# Patient Record
Sex: FEMALE | Race: WHITE | NOT HISPANIC OR LATINO
[De-identification: names, ages, dates, MRNs, and addresses within clinical notes are randomized per-mention and may not be internally consistent; named-entity substitution may affect disease eponyms.]

---

## 2020-02-27 ENCOUNTER — APPOINTMENT (OUTPATIENT)
Dept: NEUROSURGERY | Facility: CLINIC | Age: 85
End: 2020-02-27
Payer: MEDICARE

## 2020-02-27 VITALS
BODY MASS INDEX: 23.05 KG/M2 | HEIGHT: 64 IN | WEIGHT: 135 LBS | DIASTOLIC BLOOD PRESSURE: 72 MMHG | HEART RATE: 70 BPM | SYSTOLIC BLOOD PRESSURE: 133 MMHG

## 2020-02-27 DIAGNOSIS — N28.9 DISORDER OF KIDNEY AND URETER, UNSPECIFIED: ICD-10-CM

## 2020-02-27 DIAGNOSIS — I67.1 CEREBRAL ANEURYSM, NONRUPTURED: ICD-10-CM

## 2020-02-27 PROBLEM — Z00.00 ENCOUNTER FOR PREVENTIVE HEALTH EXAMINATION: Status: ACTIVE | Noted: 2020-02-27

## 2020-02-27 PROCEDURE — 99205 OFFICE O/P NEW HI 60 MIN: CPT

## 2020-03-02 NOTE — DATA REVIEWED
[de-identified] : 9 mm focus of signal abnormality in the left MCA cistern suspicious for arterial aneurysm, enhancing 1.9x 1.8 x1.5 cm left retrobulbar mass unchanged, no blood, no hydrocephalus

## 2020-03-02 NOTE — SURGICAL HISTORY
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

## 2020-03-02 NOTE — PHYSICAL EXAM
[Person] : oriented to person [Place] : oriented to place [Short Term Intact] : short term memory intact [Time] : oriented to time [Fluency] : fluency intact [Motor Tone] : muscle tone was normal in all four extremities [Motor Strength] : muscle strength was normal in all four extremities [Abnormal Walk] : normal gait [Sensation Tactile Decrease] : light touch was intact [No Muscle Atrophy] : normal bulk in all four extremities [2+] : Ankle jerk left 2+ [FreeTextEntry5] : blind in left eye, Right pupil reactive 2.0 mm in size, face symmetrical speech clear, tongue midline, decrease hearing  [FreeTextEntry6] : UE/LE motor 5/5

## 2020-03-02 NOTE — HISTORY OF PRESENT ILLNESS
[de-identified] : 86 YOF comes to office today for Endovascular evaluation. She had an MRI +/- of brain on 2/25/20120 showing a 9mm focus of signal abnormality in the left MCA cistern suspicious for arterial aneurysm. She was advised by  ENT Dr Homa Fowler's office to follow up today. She had the MRI of brain because of a 2 month history of sinus pressure and bilateral hearing loss. She denies any headaches, numbness, tingling or weakness in arms or legs or history of CVA. 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

## 2022-03-03 ENCOUNTER — APPOINTMENT (OUTPATIENT)
Dept: HEMATOLOGY ONCOLOGY | Facility: CLINIC | Age: 87
End: 2022-03-03
Payer: MEDICARE

## 2022-03-03 VITALS
SYSTOLIC BLOOD PRESSURE: 109 MMHG | HEIGHT: 64 IN | RESPIRATION RATE: 18 BRPM | BODY MASS INDEX: 22.02 KG/M2 | TEMPERATURE: 97.8 F | DIASTOLIC BLOOD PRESSURE: 73 MMHG | HEART RATE: 86 BPM | OXYGEN SATURATION: 98 % | WEIGHT: 129 LBS

## 2022-03-03 DIAGNOSIS — Z52.4 KIDNEY DONOR: ICD-10-CM

## 2022-03-03 DIAGNOSIS — Z87.39 PERSONAL HISTORY OF OTHER DISEASES OF THE MUSCULOSKELETAL SYSTEM AND CONNECTIVE TISSUE: ICD-10-CM

## 2022-03-03 DIAGNOSIS — K21.9 GASTRO-ESOPHAGEAL REFLUX DISEASE W/OUT ESOPHAGITIS: ICD-10-CM

## 2022-03-03 DIAGNOSIS — R21 RASH AND OTHER NONSPECIFIC SKIN ERUPTION: ICD-10-CM

## 2022-03-03 DIAGNOSIS — Z87.898 PERSONAL HISTORY OF OTHER SPECIFIED CONDITIONS: ICD-10-CM

## 2022-03-03 DIAGNOSIS — R68.83 CHILLS (WITHOUT FEVER): ICD-10-CM

## 2022-03-03 DIAGNOSIS — Z87.2 PERSONAL HISTORY OF DISEASES OF THE SKIN AND SUBCUTANEOUS TISSUE: ICD-10-CM

## 2022-03-03 DIAGNOSIS — Z86.79 PERSONAL HISTORY OF OTHER DISEASES OF THE CIRCULATORY SYSTEM: ICD-10-CM

## 2022-03-03 DIAGNOSIS — Z80.3 FAMILY HISTORY OF MALIGNANT NEOPLASM OF BREAST: ICD-10-CM

## 2022-03-03 DIAGNOSIS — Z87.09 PERSONAL HISTORY OF OTHER DISEASES OF THE RESPIRATORY SYSTEM: ICD-10-CM

## 2022-03-03 DIAGNOSIS — M19.019 PRIMARY OSTEOARTHRITIS, UNSPECIFIED SHOULDER: ICD-10-CM

## 2022-03-03 DIAGNOSIS — D64.9 ANEMIA, UNSPECIFIED: ICD-10-CM

## 2022-03-03 DIAGNOSIS — Z82.49 FAMILY HISTORY OF ISCHEMIC HEART DISEASE AND OTHER DISEASES OF THE CIRCULATORY SYSTEM: ICD-10-CM

## 2022-03-03 DIAGNOSIS — K92.1 MELENA: ICD-10-CM

## 2022-03-03 DIAGNOSIS — Z83.79 FAMILY HISTORY OF OTHER DISEASES OF THE DIGESTIVE SYSTEM: ICD-10-CM

## 2022-03-03 DIAGNOSIS — Z86.69 PERSONAL HISTORY OF OTHER DISEASES OF THE NERVOUS SYSTEM AND SENSE ORGANS: ICD-10-CM

## 2022-03-03 DIAGNOSIS — L85.3 XEROSIS CUTIS: ICD-10-CM

## 2022-03-03 DIAGNOSIS — Z80.0 FAMILY HISTORY OF MALIGNANT NEOPLASM OF DIGESTIVE ORGANS: ICD-10-CM

## 2022-03-03 DIAGNOSIS — Z87.891 PERSONAL HISTORY OF NICOTINE DEPENDENCE: ICD-10-CM

## 2022-03-03 DIAGNOSIS — Z78.9 OTHER SPECIFIED HEALTH STATUS: ICD-10-CM

## 2022-03-03 DIAGNOSIS — Z80.42 FAMILY HISTORY OF MALIGNANT NEOPLASM OF PROSTATE: ICD-10-CM

## 2022-03-03 PROCEDURE — 99205 OFFICE O/P NEW HI 60 MIN: CPT

## 2022-03-03 NOTE — REASON FOR VISIT
[Initial Consultation] : an initial consultation for [FreeTextEntry2] : Here for evaluation for anemia

## 2022-03-03 NOTE — ASSESSMENT
[FreeTextEntry1] : THis is a 89 y/o woman presenting with anemia , microcytic. Patient is symptomatic with sob and fatigue and dizziness. Stool for occult blood negative x3 \par Ferritin level was 13 with pcp \par \par 1) anemia \par likely due to iron def \par work up so far shows ferritin of 13, and very low iron sat with low mcv consistant with iron def \par B12 and folic acid are l normal levels\par Explained to patient that if her anemia does not improve with treatment of the iron deficiency we may need additional work-up as sometimes there are multiple factors contributing to the anemia.\par But for now send an extensive panel as she is already severely anemic and severely symptomatic.\par We did discuss treatment for iron deficiency as detailed below.\par Transfuse packed red blood cells for symptomatic anemia\par GI work-up\par \par 2) iron def anemia \par As above patient's CBC and iron studies are consistent with iron deficiency anemia\par We discussed treatment including IV iron such as Venofer or Injectafer\par Reviewed that given the severity of the patient's iron deficiency that oral iron will need to be given in high doses and this is difficult to tolerate given the risk of constipation\par Intravenous iron will allow for quicker repletion of iron stores and less side effects\par Did discuss the side effects of allergic reactions\par Also given the patient is symptomatic we discussed the pros and cons of doing a packed red blood cell transfusion.  Patient has severe shortness of breath and fatigue explained that her blood cell transfusion will improve the symptoms medially however do, the cost of the increased risk of allergic reactions infection and volume overload.\par Patient was also advised to see gastroenterology for an endoscopy and colonoscopy.\par Apparently patient has a history of gastric ulcers.\par Patient is resistant to the colonoscopy so will order CT of the chest abdomen pelvis to rule out malignancy.\par \par 3 ) weight loss \par Check CT scan of chest abdomen and pelvis rule out malignancy\par Endoscopy and colonoscopy\par \par Follow up in 1 week for cbc and iron infusion \par dw patient and daughter at length \par \par \par

## 2022-03-03 NOTE — HISTORY OF PRESENT ILLNESS
[de-identified] : This is an 88-year-old woman presenting with worsening anemia.\par Patient was started on oral iron once a day by her primary care doctor.\par \par She reports black stools since starting iron in feb 2022 when found anemia.  No blood or black stool before that. No change in bowel habits\par Now has skinnier stools now .  No \par No pain or reflux in esophagus , no dysphagia  No nausea or vomiting \par Loosing weight - 6months , 20pounds . states that she  eating the same \par Feeling more SOB then usual also , about 6 months ago , sent pulmonary . Lungs ok. \par Even walking on flat surfaces. 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 , had fall and left leg - knee and hip also . \par \par Hx of right hip total replacement \par Total shoulder was recommended \par \par Just switched to dr jones , in feb 2022 , patient was never told anemic \par \par Had colonoscopy and egd  over 5 yrs ago \par Hx of esophageal problem with concern for monteiro , was doing feqent egd, before 2020. 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

## 2022-03-03 NOTE — REVIEW OF SYSTEMS
[Fatigue] : fatigue [Recent Change In Weight] : ~T recent weight change [Palpitations] : palpitations [Shortness Of Breath] : shortness of breath [Joint Pain] : joint pain [Dizziness] : dizziness [Negative] : Heme/Lymph [Fever] : no fever [Night Sweats] : no night sweats [Chest Pain] : no chest pain [Cough] : no cough [FreeTextEntry3] : decrsaed vision left eye  [FreeTextEntry7] : thin stool  [de-identified] : no headache

## 2022-03-17 ENCOUNTER — APPOINTMENT (OUTPATIENT)
Dept: HEMATOLOGY ONCOLOGY | Facility: CLINIC | Age: 87
End: 2022-03-17
Payer: MEDICARE

## 2022-03-17 VITALS
RESPIRATION RATE: 18 BRPM | SYSTOLIC BLOOD PRESSURE: 130 MMHG | TEMPERATURE: 98 F | HEART RATE: 66 BPM | WEIGHT: 120 LBS | BODY MASS INDEX: 20.49 KG/M2 | OXYGEN SATURATION: 100 % | DIASTOLIC BLOOD PRESSURE: 76 MMHG | HEIGHT: 64 IN

## 2022-03-17 PROCEDURE — 99214 OFFICE O/P EST MOD 30 MIN: CPT

## 2022-03-18 LAB
FERRITIN SERPL-MCNC: 1118 NG/ML
IRON SATN MFR SERPL: 16 %
IRON SERPL-MCNC: 43 UG/DL
TIBC SERPL-MCNC: 272 UG/DL
UIBC SERPL-MCNC: 229 UG/DL

## 2022-03-18 NOTE — REASON FOR VISIT
[Initial Consultation] : an initial consultation for [Family Member] : family member [FreeTextEntry2] : here for follow up

## 2022-03-18 NOTE — HISTORY OF PRESENT ILLNESS
[de-identified] : This is an 88-year-old woman presenting with worsening anemia.\par Patient was started on oral iron once a day by her primary care doctor.\par \par She reports black stools since starting iron in feb 2022 when found anemia.  No blood or black stool before that. No change in bowel habits\par Now has skinnier stools now .  No \par No pain or reflux in esophagus , no dysphagia  No nausea or vomiting \par Loosing weight - 6months , 20pounds . states that she  eating the same \par Feeling more SOB then usual also , about 6 months ago , sent pulmonary . Lungs ok. \par Even walking on flat surfaces. 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 , had fall and left leg - knee and hip also . \par \par Hx of right hip total replacement \par Total shoulder was recommended \par \par Just switched to dr jones , in feb 2022 , patient was never told anemic \par \par Had colonoscopy and egd  over 5 yrs ago \par Hx of esophageal problem with concern for monteiro , was doing feqent egd, before 2020. \par \par \par  [de-identified] : 03/10/22- CT scan chest/abd/pelvis: questions ascending colonic mass vs. underdistention/contraction. advise correleatoin with colonscopy. there is also chronic thickened appearance of gastric antrum, nonspecific and perhaps r/t to contracted state and/or PUD. \par \par received injectafer 03/03 and 03/10 \par received 1 PRBC. \par prior symptoms of SOB much improved\par fatigue significantly improved\par had to reschedule GI appt with Dr. Pringle- physician was sick. rescheduled for next wednesday. \par

## 2022-03-18 NOTE — REVIEW OF SYSTEMS
[Recent Change In Weight] : ~T recent weight change [Joint Pain] : joint pain [Negative] : Heme/Lymph [Joint Stiffness] : joint stiffness [Fever] : no fever [Night Sweats] : no night sweats [Fatigue] : no fatigue [Chest Pain] : no chest pain [Palpitations] : no palpitations [Shortness Of Breath] : no shortness of breath [Cough] : no cough [Abdominal Pain] : no abdominal pain [Vomiting] : no vomiting [Constipation] : no constipation [Diarrhea] : no diarrhea [Dizziness] : no dizziness [FreeTextEntry3] : decreaed vision left eye  [FreeTextEntry7] : still has some thin stool .  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

## 2022-03-18 NOTE — ASSESSMENT
[FreeTextEntry1] : THis is a 89 y/o woman presenting with anemia , microcytic. Patient is symptomatic with sob and fatigue and dizziness. Stool for occult blood negative x3 \par Ferritin level was 13 with pcp \par \par 1) anemia \par likely due to iron def \par work up so far shows ferritin of 13, and very low iron sat with low mcv consistant with iron def \par B12 and folic acid are l normal levels\par Explained to patient that if her anemia does not improve with treatment of the iron deficiency we may need additional work-up as sometimes there are multiple factors contributing to the anemia.\par But for now send an extensive panel as she is already severely anemic and severely symptomatic.\par We did discuss treatment for iron deficiency as detailed below.\par s/p injectafer - repeat hgb 10.2 today. repeat ferritin today \par - GI appt ( Dr. Pringle) scheduled for next week. \par \par 2) iron def anemia \par As above patient's CBC and iron studies are consistent with iron deficiency anemia\par We discussed treatment including IV iron such as Venofer or Injectafer\par Reviewed that given the severity of the patient's iron deficiency that oral iron will need to be given in high doses and this is difficult to tolerate given the risk of constipation\par Intravenous iron will allow for quicker repletion of iron stores and less side effects\par Did discuss the side effects of allergic reactions\par Also given the patient is symptomatic we discussed the pros and cons of doing a packed red blood cell transfusion.  Patient has severe shortness of breath and fatigue explained that her blood cell transfusion will improve the symptoms medially however do, the cost of the increased risk of allergic reactions infection and volume overload.\par Patient is scheduled for gastroenterology for an endoscopy and colonoscopy.\par Apparently patient has a history of gastric ulcers.\par CT chest/abd/pelvis shows no active malignancy; However favors correlation with colonoscopy. \par \par 3 ) weight loss \par  CT scan of chest abdomen and pelvis as above. \par Endoscopy and colonoscopy \par \par dw patient and daughter in law at length \par Follow up in 2 weeks for repeat labs. \par Follow up in 4 weeks for MD/NP. \par

## 2022-03-23 ENCOUNTER — APPOINTMENT (OUTPATIENT)
Dept: GASTROENTEROLOGY | Facility: CLINIC | Age: 87
End: 2022-03-23
Payer: MEDICARE

## 2022-03-23 VITALS
OXYGEN SATURATION: 99 % | WEIGHT: 120 LBS | HEART RATE: 68 BPM | HEIGHT: 64 IN | DIASTOLIC BLOOD PRESSURE: 76 MMHG | BODY MASS INDEX: 20.49 KG/M2 | SYSTOLIC BLOOD PRESSURE: 128 MMHG | RESPIRATION RATE: 16 BRPM | TEMPERATURE: 98.3 F

## 2022-03-23 DIAGNOSIS — Z86.010 PERSONAL HISTORY OF COLONIC POLYPS: ICD-10-CM

## 2022-03-23 PROCEDURE — 99204 OFFICE O/P NEW MOD 45 MIN: CPT

## 2022-03-23 NOTE — HISTORY OF PRESENT ILLNESS
[FreeTextEntry1] : 89 yo F hx hiatal hernia, hx Barretts esophagus (with mild dysplasia) hx gastric ulcer,  referred for AWAIS. \par Prior EGD/colonoscopy reports reviewed - hx of Barretts esophagus. Not on PPI. \par hx colon polyps. \par 2/2016- 3 tubular adenoma\par 2/16 Barretts path:  focal intestinal metaplasia with Mild dysplasia\par \par EGD 9/2017- +ulcer in antrum, + barretts. Path negative for IM/dysplasia, ulcer neg Hpylori or IM/dysplasia. \par \par Pt followed by heme for iron def anemia, ferritin 13. required 1U PRBC and IV iron. \par Pt also c/o weight loss , 135 down to 121 over a month. \par \par CT c/a/p- question ascending colon mass vs underdistension, thickening of gastric antrum. \par \par No aspirin, blood thinners. \par \par Takes tylenol for frozen shoulder. \par No NSAIDs. \par \par Last colonoscopy: 2/2016- 3 TAs. \par \par Soc: no tobacco or significant EtOH\par \par Family Hx: no significant GI family history including colon cancer, stomach cancer, IBD, celiac\par \par ROS:\par constitutional: no weight loss, fevers\par ENT: no deafness\par Eyes: no blindness\par Neck: no lymphadenopathy\par Chest: no shortness of breath, no cough\par Cardiac: no chest pain, no palpitations\par Vascular: no leg swelling\par GI: no abdominal pain, nausea, vomiting, diarrhea, constipation, rectal bleeding, melena, dysphagia,  unless otherwise noted in HPI\par : no dysuria, dark urine\par Skin: no rashes, lesions, jaundice\par Heme: no bleeding\par Endocrine: no DM  unless otherwise stated in HPI\par \par Physical Exam: (VS noted below)\par General: alert, comfortable, in no acute distress\par Eyes: normal sclera, anicteric\par Neck: normal, supple, no neck mass\par Pulm: no respiratory distress, clear to auscultation bilaterally \par Heart: RRR, normal S1 S2\par Abd: Soft, non-tender, non-distended, normal bowel sounds, no appreciable hepatosplenomegaly, no masses palpated\par Rectal: deferred\par Ext: warm and well perfused, no edema\par Skin: no rashes, no juandice\par Neuro: alert, grossly nonfocal\par \par Labs/imaging/prior endoscopic results were reviewed to the extent available and pertinent findings noted in HPI\par \par

## 2022-03-23 NOTE — CONSULT LETTER
[Dear  ___] : Dear  [unfilled], [Consult Letter:] : I had the pleasure of evaluating your patient, [unfilled]. [Please see my note below.] : Please see my note below. [Consult Closing:] : Thank you very much for allowing me to participate in the care of this patient.  If you have any questions, please do not hesitate to contact me. [Sincerely,] : Sincerely, [FreeTextEntry3] : Mouna Pringle M.D.\par

## 2022-03-23 NOTE — ASSESSMENT
[FreeTextEntry1] : 87 yo F hx Barett's, gastric ulcer, colon polyps ,now  with iron def anemia, recommend EGD/Colonoscopy for further evaluation. \par \par Will plan on an upper endoscopy for iron def anemia, hx Barretts and GERD. Explained nature of procedure and the risks/benefits/alternative including but not limited to bleeding, infection, perforation, missed lesions, anesthesia complications. Patient understands and agrees, all questions answered.\par \par Will plan on a colonoscopy for iron def anemia, hx polyps. Explained nature of procedure and risks/benefits/alternatives including but not limited to bleeding, infection, perforation, missed lesions, anesthesia complications. Patient understands and agrees, all questions answered. Will use a split dose miralax  cleanse with clears the day prior.\par

## 2022-04-01 ENCOUNTER — RESULT REVIEW (OUTPATIENT)
Age: 87
End: 2022-04-01

## 2022-04-03 ENCOUNTER — RESULT REVIEW (OUTPATIENT)
Age: 87
End: 2022-04-03

## 2022-04-04 ENCOUNTER — APPOINTMENT (OUTPATIENT)
Dept: GASTROENTEROLOGY | Facility: HOSPITAL | Age: 87
End: 2022-04-04
Payer: MEDICARE

## 2022-04-04 PROCEDURE — 45378 DIAGNOSTIC COLONOSCOPY: CPT

## 2022-04-04 PROCEDURE — 43239 EGD BIOPSY SINGLE/MULTIPLE: CPT

## 2022-04-06 ENCOUNTER — APPOINTMENT (OUTPATIENT)
Dept: HEMATOLOGY ONCOLOGY | Facility: CLINIC | Age: 87
End: 2022-04-06

## 2022-04-06 VITALS
HEIGHT: 64 IN | WEIGHT: 119 LBS | BODY MASS INDEX: 20.32 KG/M2 | OXYGEN SATURATION: 100 % | TEMPERATURE: 98 F | SYSTOLIC BLOOD PRESSURE: 129 MMHG | HEART RATE: 71 BPM | DIASTOLIC BLOOD PRESSURE: 80 MMHG | RESPIRATION RATE: 18 BRPM

## 2022-04-21 ENCOUNTER — APPOINTMENT (OUTPATIENT)
Dept: HEMATOLOGY ONCOLOGY | Facility: CLINIC | Age: 87
End: 2022-04-21

## 2022-04-21 ENCOUNTER — APPOINTMENT (OUTPATIENT)
Dept: GASTROENTEROLOGY | Facility: CLINIC | Age: 87
End: 2022-04-21

## 2022-04-21 VITALS
WEIGHT: 119 LBS | HEART RATE: 69 BPM | RESPIRATION RATE: 18 BRPM | BODY MASS INDEX: 20.32 KG/M2 | HEIGHT: 64 IN | TEMPERATURE: 97.9 F | DIASTOLIC BLOOD PRESSURE: 60 MMHG | SYSTOLIC BLOOD PRESSURE: 112 MMHG | OXYGEN SATURATION: 97 %

## 2022-04-22 LAB — FERRITIN SERPL-MCNC: 469 NG/ML

## 2022-05-04 ENCOUNTER — APPOINTMENT (OUTPATIENT)
Dept: GASTROENTEROLOGY | Facility: CLINIC | Age: 87
End: 2022-05-04
Payer: MEDICARE

## 2022-05-04 PROCEDURE — 99443: CPT | Mod: 95

## 2022-05-04 NOTE — REASON FOR VISIT
[Home] : at home, [unfilled] , at the time of the visit. [Medical Office: ([facility])___] : at the medical office located in  [Verbal consent obtained from patient] : the patient, [unfilled] [Follow-Up: _____] : a [unfilled] follow-up visit

## 2022-05-04 NOTE — HISTORY OF PRESENT ILLNESS
[FreeTextEntry1] : Follow up for iron def anemia, gastric ulcer, monteiro's esophagus. \par EGD reviewed - large ulcer with surrounding nodule/mass, gastritis, monteiro's esophagus. \par Path: Monteiro's with indefinite for dysplasia  (rec repeat after treatment for GERD), Neg HP, no malignancy. \par \par Pt taking PPI BID\par doing well\par denies GI bleeding\par has upcoming appt w heme to check on H/H and iron panel. \par \par \par ROS:\par constitutional: no weight loss, fevers\par ENT: no deafness\par Eyes: no blindness\par Neck: no lymphadenopathy\par Chest: no shortness of breath, no cough\par Cardiac: no chest pain, no palpitations\par Vascular: no leg swelling\par GI: no abdominal pain, nausea, vomiting, diarrhea, constipation, rectal bleeding, melena, dysphagia,  unless otherwise noted in HPI\par : no dysuria, dark urine\par Skin: no rashes, lesions, jaundice\par Heme: no bleeding\par Endocrine: no DM  unless otherwise stated in HPI\par \par Labs/imaging/prior endoscopic results were reviewed to the extent available and pertinent findings noted in HPI\par

## 2022-05-04 NOTE — ASSESSMENT
[FreeTextEntry1] : 87 yo F with iron def anemia due to gastric ulcer.  Path neg for malignancy. Was taking + NAIDS (Aleve)\par + Barretts with indef for dysplasia. \par \par - avoid nsaids\par - PPI BID x 3 mo\par - repeat EGD in  3mo for ulcer healing and re-biopsy Barretts indef for dysplasia\par \par Will plan on an upper endoscopy for gastric ulcer -- due in 3 mo. Explained nature of procedure and the risks/benefits/alternative including but not limited to bleeding, infection, perforation, missed lesions, anesthesia complications. Patient understands and agrees, all questions answered.\par

## 2022-05-12 ENCOUNTER — APPOINTMENT (OUTPATIENT)
Dept: HEMATOLOGY ONCOLOGY | Facility: CLINIC | Age: 87
End: 2022-05-12
Payer: MEDICARE

## 2022-05-12 VITALS
DIASTOLIC BLOOD PRESSURE: 65 MMHG | WEIGHT: 116 LBS | TEMPERATURE: 97.9 F | HEIGHT: 64 IN | RESPIRATION RATE: 18 BRPM | BODY MASS INDEX: 19.81 KG/M2 | HEART RATE: 69 BPM | OXYGEN SATURATION: 98 % | SYSTOLIC BLOOD PRESSURE: 125 MMHG

## 2022-05-12 DIAGNOSIS — R63.4 ABNORMAL WEIGHT LOSS: ICD-10-CM

## 2022-05-12 PROCEDURE — 99214 OFFICE O/P EST MOD 30 MIN: CPT

## 2022-05-12 NOTE — REASON FOR VISIT
[Follow-Up Visit] : a follow-up visit for [FreeTextEntry2] : Patient presents for follow-up of iron deficiency

## 2022-05-12 NOTE — HISTORY OF PRESENT ILLNESS
[de-identified] : This is an 88-year-old woman presenting with worsening anemia.\par Patient was started on oral iron once a day by her primary care doctor.\par \par She reports black stools since starting iron in feb 2022 when found anemia.  No blood or black stool before that. No change in bowel habits\par Now has skinnier stools now .  No \par No pain or reflux in esophagus , no dysphagia  No nausea or vomiting \par Loosing weight - 6months , 20pounds . states that she  eating the same \par Feeling more SOB then usual also , about 6 months ago , sent pulmonary . Lungs ok. \par Even walking on flat surfaces. 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 , had fall and left leg - knee and hip also . \par \par Hx of right hip total replacement \par Total shoulder was recommended \par \par Just switched to dr jones , in feb 2022 , patient was never told anemic \par \par Had colonoscopy and egd  over 5 yrs ago \par Hx of esophageal problem with concern for monteiro , was doing feqent egd, before 2020. \par \par 3/2022- ct scan shows thickening in colon and stomach \par  [de-identified] : \par sob and fatigue is better \par climbing stairs \par ulcer egd  on schedule \par on ppi ( omepeprazole ) \par had colonoscopy \par dr jones pcp \par taking oral iron  once day \par going for hip replacement \par \par hb is 11.7 now

## 2022-05-12 NOTE — ASSESSMENT
[FreeTextEntry1] : THis is a 87 y/o woman presenting with anemia , microcytic. Patient is symptomatic with sob and fatigue and dizziness. Stool for occult blood negative x3 \par Ferritin level was 13 with pcp \par \par 1) anemia \par likely due to iron def \par Treated with blood transfusion and intravenous iron with significant improvement\par If anemia fails to resolve will need additional work-up including bone marrow biopsy\par repeat cbc today   shows hb of 11.7 near normal \par \par 2) iron def anemia \par -Ferritin was severely low on presentation with anemia\par -Treated with blood transfusion as patient was severely symptomatic with fatigue and shortness of breath\par -Treated with IV iron, Injectafer\par -Tolerated well with significant improvement in symptoms and hemoglobin\par -CT scan showed thickening in the stomach and colon therefore patient was referred for endoscopy and colonoscopy\par -Patient saw GI and had procedures done and was found to have very large ulcer in her stomach and monteiro's\par Currently on treatment for the ulcer.\par - repeat egd in 3 months \par -repeat ferritin today \par \par 3 ) weight loss \par continues to loose weight ? etiology , due to ulcer ? patient states she feels much better now \par nutrition consult \par s/p CT scan of chest abdomen and pelvis  showed thickeing of stomach and colon \par s/p Endoscopy ( and colonoscopy? ) \par \par \par Follow up in 1 month for labs and to check weight , 3 month MD appt \par \par

## 2022-05-12 NOTE — REVIEW OF SYSTEMS
[Negative] : Allergic/Immunologic [FreeTextEntry2] : improved energy  [FreeTextEntry7] : no bowel changes

## 2022-05-15 LAB
ALBUMIN SERPL ELPH-MCNC: 4.1 G/DL
ALP BLD-CCNC: 86 U/L
ALT SERPL-CCNC: 8 U/L
ANION GAP SERPL CALC-SCNC: 17 MMOL/L
AST SERPL-CCNC: 12 U/L
BILIRUB SERPL-MCNC: 0.3 MG/DL
BUN SERPL-MCNC: 16 MG/DL
CALCIUM SERPL-MCNC: 9.7 MG/DL
CHLORIDE SERPL-SCNC: 99 MMOL/L
CO2 SERPL-SCNC: 25 MMOL/L
CREAT SERPL-MCNC: 0.77 MG/DL
EGFR: 74 ML/MIN/1.73M2
FERRITIN SERPL-MCNC: 511 NG/ML
FOLATE SERPL-MCNC: 7.5 NG/ML
GLUCOSE SERPL-MCNC: 60 MG/DL
POTASSIUM SERPL-SCNC: 4.3 MMOL/L
PROT SERPL-MCNC: 6.3 G/DL
SODIUM SERPL-SCNC: 141 MMOL/L
VIT B12 SERPL-MCNC: 500 PG/ML

## 2022-06-15 ENCOUNTER — APPOINTMENT (OUTPATIENT)
Dept: HEMATOLOGY ONCOLOGY | Facility: CLINIC | Age: 87
End: 2022-06-15

## 2022-06-15 VITALS
HEIGHT: 64 IN | HEART RATE: 75 BPM | BODY MASS INDEX: 20.14 KG/M2 | RESPIRATION RATE: 18 BRPM | TEMPERATURE: 98.2 F | WEIGHT: 118 LBS | DIASTOLIC BLOOD PRESSURE: 64 MMHG | SYSTOLIC BLOOD PRESSURE: 121 MMHG | OXYGEN SATURATION: 96 %

## 2022-06-20 LAB
FERRITIN SERPL-MCNC: 498 NG/ML
IRON SATN MFR SERPL: 17 %
IRON SERPL-MCNC: 33 UG/DL
TIBC SERPL-MCNC: 196 UG/DL
UIBC SERPL-MCNC: 163 UG/DL

## 2022-07-12 ENCOUNTER — APPOINTMENT (OUTPATIENT)
Dept: HEMATOLOGY ONCOLOGY | Facility: CLINIC | Age: 87
End: 2022-07-12

## 2022-07-12 ENCOUNTER — RESULT REVIEW (OUTPATIENT)
Age: 87
End: 2022-07-12

## 2022-07-12 VITALS
BODY MASS INDEX: 20.32 KG/M2 | RESPIRATION RATE: 18 BRPM | HEART RATE: 69 BPM | HEIGHT: 64 IN | TEMPERATURE: 98.3 F | OXYGEN SATURATION: 98 % | SYSTOLIC BLOOD PRESSURE: 128 MMHG | WEIGHT: 119 LBS | DIASTOLIC BLOOD PRESSURE: 66 MMHG

## 2022-07-12 PROCEDURE — 99214 OFFICE O/P EST MOD 30 MIN: CPT

## 2022-07-14 NOTE — REVIEW OF SYSTEMS
[Negative] : Allergic/Immunologic [FreeTextEntry2] : improved energy  [FreeTextEntry7] : no bowel changes  [FreeTextEntry9] : hips are feeling better after surgery. able to walk again without pain

## 2022-07-14 NOTE — ASSESSMENT
[FreeTextEntry1] : THis is a 89 y/o woman presenting with anemia , microcytic. Patient is symptomatic with sob and fatigue and dizziness. Stool for occult blood negative x3 \par Ferritin level was 13 with pcp . now improved with injectafer. \par \par 1) anemia \par likely due to iron def \par Treated with blood transfusion and intravenous iron with significant improvement\par If anemia fails to resolve will need additional work-up including bone marrow biopsy\par repeat cbc today   shows hb of 11.5 - stable. \par \par 2) iron def anemia \par -Ferritin was severely low on presentation with anemia\par -Treated with blood transfusion as patient was severely symptomatic with fatigue and shortness of breath\par -Treated with IV iron, Injectafer\par -Tolerated well with significant improvement in symptoms and hemoglobin\par -CT scan showed thickening in the stomach and colon therefore patient was referred for endoscopy and colonoscopy\par -Patient saw GI and had procedures done and was found to have very large ulcer in her stomach and monteiro's\par Currently on treatment for the ulcer.\par - repeat egd in 3 months per Dr. Pringle\par -ferritin elevated June 2022. continue to monitor closely \par \par 3 ) weight loss \par weight stable. \par pt states she feels better. just has a hard time choosing what to eat. \par consider nutrition consult \par s/p CT scan of chest abdomen and pelvis  showed thickening of stomach and colon \par s/p Endoscopy /colonscopy ?\par \par \par Follow up in 1 month for repeat labs. \par RTC 6 months , sooner PRN. \par \par

## 2022-09-06 ENCOUNTER — APPOINTMENT (OUTPATIENT)
Dept: HEMATOLOGY ONCOLOGY | Facility: CLINIC | Age: 87
End: 2022-09-06

## 2022-09-06 ENCOUNTER — RESULT REVIEW (OUTPATIENT)
Age: 87
End: 2022-09-06

## 2022-09-06 VITALS
HEIGHT: 64 IN | DIASTOLIC BLOOD PRESSURE: 65 MMHG | RESPIRATION RATE: 18 BRPM | TEMPERATURE: 98.1 F | BODY MASS INDEX: 20.49 KG/M2 | WEIGHT: 120 LBS | SYSTOLIC BLOOD PRESSURE: 121 MMHG | HEART RATE: 76 BPM | OXYGEN SATURATION: 98 %

## 2022-09-08 LAB
25(OH)D3 SERPL-MCNC: 74 NG/ML
FERRITIN SERPL-MCNC: 686 NG/ML
FOLATE SERPL-MCNC: >20 NG/ML
GGT SERPL-CCNC: 725 U/L
IRON SATN MFR SERPL: 14 %
IRON SERPL-MCNC: 34 UG/DL
TIBC SERPL-MCNC: 244 UG/DL
UIBC SERPL-MCNC: 210 UG/DL
VIT B12 SERPL-MCNC: 614 PG/ML

## 2022-09-11 ENCOUNTER — RESULT REVIEW (OUTPATIENT)
Age: 87
End: 2022-09-11

## 2022-09-12 ENCOUNTER — APPOINTMENT (OUTPATIENT)
Dept: PAIN MANAGEMENT | Facility: CLINIC | Age: 87
End: 2022-09-12

## 2022-09-12 VITALS — SYSTOLIC BLOOD PRESSURE: 100 MMHG | OXYGEN SATURATION: 98 % | HEART RATE: 78 BPM | DIASTOLIC BLOOD PRESSURE: 60 MMHG

## 2022-09-12 VITALS — HEIGHT: 64 IN | WEIGHT: 120 LBS | BODY MASS INDEX: 20.49 KG/M2

## 2022-09-12 PROCEDURE — 99204 OFFICE O/P NEW MOD 45 MIN: CPT

## 2022-09-12 NOTE — CONSULT LETTER
[Dear  ___] : Dear  [unfilled], [Courtesy Letter:] : I had the pleasure of seeing your patient, [unfilled], in my office today. [Please see my note below.] : Please see my note below. [Consult Closing:] : Thank you very much for allowing me to participate in the care of this patient.  If you have any questions, please do not hesitate to contact me. [Sincerely,] : Sincerely, [FreeTextEntry3] : Rivera Ernst DO

## 2022-09-12 NOTE — REVIEW OF SYSTEMS
[Joint Pain] : joint pain [Decreased ROM] : decreased range of motion [Weakness] : weakness [Negative] : Heme/Lymph [FreeTextEntry9] : Swelling

## 2022-09-12 NOTE — DATA REVIEWED
[FreeTextEntry1] : LEFT Shoulder XRAY 9/2021\par \par Severe  osteoarthritis\par \par Exam: CT CERVICAL SPINE \par Order#: [id number] \par \par \par \par History: fall. \par \par  CT cervical spine 4/24/2021 \par \par  Thin helical axial sections through the cervical spine obtained with coronal and sagittal \par reconstructed images provided. \par \par  There is straightening of the cervical curvature on sagittal reconstructed images. There is mild \par dextroconvexity of the cervical spine. There is no acute fracture. There is mild spondylolisthesis \par of C4 on C5, C5 on C6 and C7 on T1. The craniocervical junction and C1-C2 relationship maintained. 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 \par \par  There is multilevel cervical spondylosis. There is marked degenerative disc disease at C6-7 \par resulting in mild right-sided posterior endplate osteophytic ridging/calcified disc resulting in \par mild right-sided spinal stenosis. There is bilateral facet arthritis and uncovertebral disease \par resulting in variable degrees of bony foraminal stenoses. \par \par  There is no paraspinal hematoma. There is a enlarged heterogeneous and paucity dense left thyroid \par lobe mass measuring approximately 2.6 x 3.5 x 4 cm. \par \par  Limited images through the upper thorax reveals no apical consolidation or pneumothorax. \par \par \par  IMPRESSION: \par \par  No acute fracture cervical spine. See further comments above. 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 Clinical correlation. \par \par --\par \par \par [facility] Reference #: [id number]\par \par Others' Prescriptions\par Patient Name: Grace KnutsonBirth Date: 01/07/1934\par Address: [address] [place] [address] [place] [address]Hxs: Female\par Rx Written	Rx Dispensed	Drug	Quantity	Days Supply	Prescriber Name	Prescriber Maria G #	Payment Method	Dispenser\par 06/07/2022	06/07/2022	tramadol hcl 50 mg tablet	30	7	Vanesa Qiu	[id number]	Medicare	[id number]\par

## 2022-09-12 NOTE — HISTORY OF PRESENT ILLNESS
[10 (pain as bad as you can imagine)] : 1. What number best describes your pain on average in the past week? 10/10 pain [10 (completely interferes)] : 3. What number best describes how, during the past week, pain has interfered with your general activity? 10/10 pain [_______] : [unfilled] [___ yrs] : [unfilled] year(s) ago [Constant] : constant [6] : a minimum pain level of 6/10 [10] : a maximum pain level of 10/10 [Sharp] : sharp [Transitioning] : transitioning [Lifting] : lifting [Medications] : medications [Heat] : heat [FreeTextEntry1] : HPI - MsRose LOU is a 88 year F Rt hand dominant with PHx as below, referred by Doreen Dickson who presents today with chief complaint of shoulder pain LEFT sided. Reports that it developed 2 years ago. It is located in the cervical spine;  there is radiation of the pain into the left shoulder. The pain is presently 10/10 in severity on the numerical rating scale. It is sharp in nature. The pain is constant. There is diurnal worsening, during the night. The pain is aggravated with cervical extension, LUE overhead activity. The pain improves with rest. The pain is functionally and emotionally disabling for the patient as its preventing them from going about activities of daily living, such as routine housework. The pain does impair the patient’s ability to sleep. \par \par Patient has not been seen by pain management in the past.\par Patient attests to 6 weeks of provider directed treatment, including physical therapy\par Patient reports treatment that occurred within the last 3 months\par Patient reports that symptoms have been persistent and and progressing after treatment\par  \par Reports there is no present numbness, there is no weakness. Patient denies any bowel/bladder incontinence, no saddle/perineal anesthesia or any other red flag signs or symptoms. Reports regular BMs.\par   [FreeTextEntry2] : 30 [FreeTextEntry7] : Patient presents with both shoulders pain. The left shoulder is more severe than the right with the pain level of 6.

## 2022-09-12 NOTE — ASSESSMENT
[FreeTextEntry1] : Ms. ALICIA LOU is a 88 year F solitary  suffering from left sided neck and shoulder pain, that based upon today's subjective complaints, physical examination, and imaging review, is likely secondary to osteoarthritis\par \par >> Medications\par \par Chronic opioid use for non-malignant pain, in particular at high doses would not be recommended since it can potentially lead to hyperalgesia (hypersensitivity), tolerance and addiction. \par  \par Trial Lidocaine patch\par  \par >> Interventions\par \par LEFT sided peripheral nerve stilulator with SPRINT of Suprascapular nerve for shoulder pain\par  \par >> Therapy and Other Modalities\par  \par Continue with daily home stretching regimen\par \par >> Imaging and Other Studies\par \par See Media \par \par >> Consults\par \par None ordered

## 2022-09-12 NOTE — PHYSICAL EXAM
[de-identified] : General: Well-developed and well-nourished.  No acute distress.\par Psychiatric: Behavior was cooperative \par Head:  Normocephalic and atraumatic\par Eyes:  Sclera white. Conjunctiva and eyelids pink and moist without discharge.\par Cardiovascular:  Regular\par Respiratory:  Trachea midline. Normal effort.\par No accessory muscle use with respiration\par Abdomen: Non distended, soft and nontender\par Skin:  No rashes, ulcers, or lesions appreciated.\par Neck: There is pain with extension,   limited ROM\par Shoulder Exam\par ROM: ++ limited by pain\par Empty can sign: positive\par Torres exam: positive\par Scarf test: positive\par AC Joint Tenderness: positive \par Extremities: No edema \par Neuro: CN 2-12 Grossly intact\par Sensation to light touch is intact in all extremities\par Gait: Ambulates with SAC assistive device.

## 2022-09-16 ENCOUNTER — NON-APPOINTMENT (OUTPATIENT)
Age: 87
End: 2022-09-16

## 2022-09-20 ENCOUNTER — NON-APPOINTMENT (OUTPATIENT)
Age: 87
End: 2022-09-20

## 2022-10-03 ENCOUNTER — TRANSCRIPTION ENCOUNTER (OUTPATIENT)
Age: 87
End: 2022-10-03

## 2022-10-18 ENCOUNTER — APPOINTMENT (OUTPATIENT)
Dept: HEMATOLOGY ONCOLOGY | Facility: CLINIC | Age: 87
End: 2022-10-18

## 2022-10-18 ENCOUNTER — RESULT REVIEW (OUTPATIENT)
Age: 87
End: 2022-10-18

## 2022-10-18 VITALS
BODY MASS INDEX: 20.14 KG/M2 | HEIGHT: 64 IN | RESPIRATION RATE: 18 BRPM | OXYGEN SATURATION: 99 % | TEMPERATURE: 97.5 F | DIASTOLIC BLOOD PRESSURE: 67 MMHG | HEART RATE: 61 BPM | WEIGHT: 118 LBS | SYSTOLIC BLOOD PRESSURE: 131 MMHG

## 2022-10-20 LAB
FERRITIN SERPL-MCNC: 431 NG/ML
IRON SATN MFR SERPL: 20 %
IRON SERPL-MCNC: 49 UG/DL
TIBC SERPL-MCNC: 240 UG/DL
UIBC SERPL-MCNC: 191 UG/DL

## 2022-11-14 ENCOUNTER — RX RENEWAL (OUTPATIENT)
Age: 87
End: 2022-11-14

## 2023-01-13 ENCOUNTER — LABORATORY RESULT (OUTPATIENT)
Age: 88
End: 2023-01-13

## 2023-01-13 ENCOUNTER — RESULT REVIEW (OUTPATIENT)
Age: 88
End: 2023-01-13

## 2023-01-13 ENCOUNTER — APPOINTMENT (OUTPATIENT)
Dept: HEMATOLOGY ONCOLOGY | Facility: CLINIC | Age: 88
End: 2023-01-13

## 2023-01-13 VITALS
WEIGHT: 120 LBS | SYSTOLIC BLOOD PRESSURE: 113 MMHG | BODY MASS INDEX: 20.49 KG/M2 | OXYGEN SATURATION: 97 % | HEIGHT: 64 IN | RESPIRATION RATE: 18 BRPM | DIASTOLIC BLOOD PRESSURE: 64 MMHG | TEMPERATURE: 97.6 F | HEART RATE: 60 BPM

## 2023-01-16 LAB
25(OH)D3 SERPL-MCNC: 70.8 NG/ML
FERRITIN SERPL-MCNC: 597 NG/ML
FOLATE SERPL-MCNC: >20 NG/ML
IRON SATN MFR SERPL: 13 %
IRON SERPL-MCNC: 29 UG/DL
TIBC SERPL-MCNC: 226 UG/DL
UIBC SERPL-MCNC: 196 UG/DL
VIT B12 SERPL-MCNC: 605 PG/ML

## 2023-01-17 ENCOUNTER — APPOINTMENT (OUTPATIENT)
Dept: HEMATOLOGY ONCOLOGY | Facility: CLINIC | Age: 88
End: 2023-01-17
Payer: MEDICARE

## 2023-01-31 ENCOUNTER — APPOINTMENT (OUTPATIENT)
Dept: HEMATOLOGY ONCOLOGY | Facility: CLINIC | Age: 88
End: 2023-01-31
Payer: MEDICARE

## 2023-01-31 VITALS
DIASTOLIC BLOOD PRESSURE: 68 MMHG | SYSTOLIC BLOOD PRESSURE: 135 MMHG | TEMPERATURE: 98 F | HEIGHT: 64 IN | RESPIRATION RATE: 18 BRPM | WEIGHT: 120 LBS | OXYGEN SATURATION: 98 % | BODY MASS INDEX: 20.49 KG/M2 | HEART RATE: 68 BPM

## 2023-01-31 PROCEDURE — 36415 COLL VENOUS BLD VENIPUNCTURE: CPT

## 2023-01-31 PROCEDURE — 99213 OFFICE O/P EST LOW 20 MIN: CPT | Mod: 25

## 2023-01-31 RX ORDER — DEXLANSOPRAZOLE 60 MG/1
60 CAPSULE, DELAYED RELEASE ORAL
Qty: 30 | Refills: 4 | Status: DISCONTINUED | COMMUNITY
Start: 2022-04-04 | End: 2023-01-31

## 2023-01-31 RX ORDER — FERROUS SULFATE 325(65) MG
325 (65 FE) TABLET ORAL
Refills: 0 | Status: ACTIVE | COMMUNITY

## 2023-01-31 RX ORDER — MULTIVITAMIN/IRON/FOLIC ACID 18MG-0.4MG
TABLET ORAL
Refills: 0 | Status: ACTIVE | COMMUNITY

## 2023-01-31 RX ORDER — DIAZEPAM 5 MG/1
5 TABLET ORAL
Qty: 2 | Refills: 0 | Status: DISCONTINUED | COMMUNITY
Start: 2022-09-14 | End: 2023-01-31

## 2023-01-31 RX ORDER — OMEPRAZOLE 40 MG/1
40 CAPSULE, DELAYED RELEASE ORAL
Qty: 180 | Refills: 0 | Status: DISCONTINUED | COMMUNITY
Start: 2022-04-05 | End: 2023-01-31

## 2023-01-31 RX ORDER — ATORVASTATIN CALCIUM 40 MG/1
40 TABLET, FILM COATED ORAL
Refills: 0 | Status: ACTIVE | COMMUNITY

## 2023-01-31 NOTE — REVIEW OF SYSTEMS
[FreeTextEntry2] : improved energy  [FreeTextEntry7] : no bowel changes  [FreeTextEntry9] : wobbly on feet

## 2023-01-31 NOTE — HISTORY OF PRESENT ILLNESS
[de-identified] : This is an 88-year-old woman presenting with worsening anemia.\par Patient was started on oral iron once a day by her primary care doctor.\par \par She reports black stools since starting iron in feb 2022 when found anemia.  No blood or black stool before that. No change in bowel habits\par Now has skinnier stools now .  No \par No pain or reflux in esophagus , no dysphagia  No nausea or vomiting \par Loosing weight - 6months , 20pounds . states that she  eating the same \par Feeling more SOB then usual also , about 6 months ago , sent pulmonary . Lungs ok. \par Even walking on flat surfaces. 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 , had fall and left leg - knee and hip also . \par \par Hx of right hip total replacement \par Total shoulder was recommended \par \par Just switched to dr jones , in feb 2022 , patient was never told anemic \par \par Had colonoscopy and egd  over 5 yrs ago \par Hx of esophageal problem with concern for monteiro , was doing feqent egd, before 2020. \par \par 3/2022- ct scan shows thickening in colon and stomach \par  [de-identified] : Patient presents for routine follow up for anemia. \par Has not followed with Dr. Pringle for MRCP. Last EGD/CNY 4/2022 \par Remains on Omeprazole. \par Her Alk Phos remains elevated s/p US liver 9/2022 normal sonographic appearance of liver. CBD 10mm recmommend MRCP.\par Recently had COVID.

## 2023-01-31 NOTE — ASSESSMENT
[FreeTextEntry1] : This is an 89 year old female presenting with microcytic anemia. Patient is symptomatic with SOB, fatigue, and dizziness. Stool for occult blood negative x3. Ferritin level was 13 with PCP now improved with Injectafer. \par \par #anemia \par - 2/2 to AWAIS \par - s/p blood transfusion and IV iron with significant improvement\par - If anemia fails to resolve will need additional work-up including bone marrow biopsy\par - CBC 1/13/23 Hgb 11.5 stable.  % 13 - borderline AWAIS, ESR/CRP elevated - element of inflammation\par \par #iron def anemia \par - Ferritin was severely low on presentation with anemia\par - s/p blood transfusion as patient was severely symptomatic with fatigue and SOB \par - s/p Injectafer. Tolerated well with significant improvement in symptoms \par - CT scan showed thickening in the stomach and colon therefore patient was referred for endoscopy and colonoscopy\par - s/p eval with Dr. 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 Currently on treatment for the ulcer with omeprazole.\par - Ferritin remains elevated - likely elevated as acute phase reactant. Hgb 11.5 stable.  % 13 - borderline AWAIS, ESR/CRP elevated - element of inflammation\par \par #weight loss \par - Weight stable \par - Consider nutrition consult \par - s/p CT scan of chest abdomen and pelvis  showed thickening of stomach and colon \par - s/p EGD/CNY \par \par RTC in 3-4 months with cbc with diff, cmp, CRP, ESR, iron, ferritin, b12 folate, TSH

## 2023-02-01 ENCOUNTER — APPOINTMENT (OUTPATIENT)
Dept: GASTROENTEROLOGY | Facility: CLINIC | Age: 88
End: 2023-02-01

## 2023-02-03 ENCOUNTER — NON-APPOINTMENT (OUTPATIENT)
Age: 88
End: 2023-02-03

## 2023-02-15 ENCOUNTER — NON-APPOINTMENT (OUTPATIENT)
Age: 88
End: 2023-02-15

## 2023-03-15 ENCOUNTER — APPOINTMENT (OUTPATIENT)
Dept: GASTROENTEROLOGY | Facility: CLINIC | Age: 88
End: 2023-03-15
Payer: MEDICARE

## 2023-03-15 PROCEDURE — 99214 OFFICE O/P EST MOD 30 MIN: CPT | Mod: 95

## 2023-03-15 NOTE — ASSESSMENT
[FreeTextEntry1] : #chronic GERD, monteiro's indef for dysplasia, gastric ulcer\par - cont PPI BID\par - repeat EGD is due\par \par #elevated LFTs (alk phos, AST), mildly dilated cbd, could be post ccy changes. \par - check lfts again, check ggt, alk phos isoenzymes, serologic w/u including AMA\par - we have recommended MRCP since sept 2022, pt is hesitant to pursue MRI (uncomfortable for her ,takes valium prior). \par \par f/u after repeat labs , and decide if she will pursue MRCP. if she does, then will wait on EGD in the event she also needs EUS/ERCP\par she is going to [facility] for labs on March 29.

## 2023-03-15 NOTE — HISTORY OF PRESENT ILLNESS
[de-identified] : April 4, 22- HH, barretts, gatsric ulcer, large mass/nodule surrounding gastric ulcer in antrum. \par path: Barrets indef for dysplasia.  Mass - neg for malignancy , neg HP. [FreeTextEntry1] : Apri 4, 22- tics, hemorrhoids.

## 2023-03-29 ENCOUNTER — LABORATORY RESULT (OUTPATIENT)
Age: 88
End: 2023-03-29

## 2023-03-29 ENCOUNTER — RESULT REVIEW (OUTPATIENT)
Age: 88
End: 2023-03-29

## 2023-03-29 ENCOUNTER — APPOINTMENT (OUTPATIENT)
Dept: HEMATOLOGY ONCOLOGY | Facility: CLINIC | Age: 88
End: 2023-03-29

## 2023-03-29 VITALS
TEMPERATURE: 97.8 F | SYSTOLIC BLOOD PRESSURE: 122 MMHG | RESPIRATION RATE: 18 BRPM | HEART RATE: 68 BPM | OXYGEN SATURATION: 98 % | DIASTOLIC BLOOD PRESSURE: 67 MMHG | BODY MASS INDEX: 20.49 KG/M2 | WEIGHT: 120 LBS | HEIGHT: 64 IN

## 2023-04-04 LAB
FERRITIN SERPL-MCNC: 567 NG/ML
IRON SATN MFR SERPL: 12 %
IRON SERPL-MCNC: 29 UG/DL
TIBC SERPL-MCNC: 233 UG/DL
UIBC SERPL-MCNC: 204 UG/DL

## 2023-04-06 LAB
A1AT SERPL-MCNC: 257 MG/DL
ALP BLD-CCNC: 237 IU/L
ALP BONE CFR SERPL: 28 %
ALP INTEST CFR SERPL: 0 %
ALP LIVER CFR SERPL: 72 %
ANA SER IF-ACNC: NEGATIVE
CERULOPLASMIN SERPL-MCNC: 35 MG/DL
GGT SERPL-CCNC: 183 U/L
HBV CORE IGG+IGM SER QL: NONREACTIVE
HBV SURFACE AB SER QL: NONREACTIVE
HBV SURFACE AG SER QL: NONREACTIVE
HCV AB SER QL: NONREACTIVE
HCV S/CO RATIO: 0.08 S/CO
HEPATITIS A IGG ANTIBODY: NONREACTIVE
MITOCHONDRIA AB SER IF-ACNC: NORMAL
SMOOTH MUSCLE AB SER QL IF: NORMAL
TSH SERPL-ACNC: 1.77 UIU/ML

## 2023-04-12 ENCOUNTER — TRANSCRIPTION ENCOUNTER (OUTPATIENT)
Age: 88
End: 2023-04-12

## 2023-05-23 ENCOUNTER — RESULT REVIEW (OUTPATIENT)
Age: 88
End: 2023-05-23

## 2023-05-23 ENCOUNTER — APPOINTMENT (OUTPATIENT)
Dept: HEMATOLOGY ONCOLOGY | Facility: CLINIC | Age: 88
End: 2023-05-23
Payer: MEDICARE

## 2023-05-23 VITALS
DIASTOLIC BLOOD PRESSURE: 60 MMHG | OXYGEN SATURATION: 98 % | TEMPERATURE: 98.3 F | BODY MASS INDEX: 20.66 KG/M2 | HEIGHT: 64 IN | RESPIRATION RATE: 18 BRPM | WEIGHT: 121 LBS | HEART RATE: 67 BPM | SYSTOLIC BLOOD PRESSURE: 105 MMHG

## 2023-05-23 PROCEDURE — 99214 OFFICE O/P EST MOD 30 MIN: CPT

## 2023-05-23 NOTE — REVIEW OF SYSTEMS
[Negative] : Allergic/Immunologic [Dysphagia] : no dysphagia [Loss of Hearing] : no loss of hearing [Hoarseness] : no hoarseness [Odynophagia] : no odynophagia [Mucosal Pain] : no mucosal pain [Joint Stiffness] : joint stiffness [FreeTextEntry2] : energy stable. not worse. weight stable.  [FreeTextEntry4] : see interval hx  [FreeTextEntry7] : no bowel changes  [FreeTextEntry9] : chronic hip pain. not worse. had hip surgery in may 2021.  [de-identified] : see interval hx

## 2023-05-23 NOTE — ASSESSMENT
[FreeTextEntry1] : This is an 89 year old female presenting with microcytic anemia. Patient is symptomatic with SOB, fatigue, and dizziness. Stool for occult blood negative x3. Ferritin level was 13 with PCP now improved with Injectafer. \par \par #anemia \par - 2/2 to AWAIS \par - s/p blood transfusion and IV iron with significant improvement\par - If anemia fails to resolve will need additional work-up including bone marrow biopsy\par -hgb today 11.9. \par \par #iron def anemia \par - Ferritin was severely low on presentation with anemia\par - s/p blood transfusion as patient was severely symptomatic with fatigue and SOB \par - s/p Injectafer. Tolerated well with significant improvement in symptoms \par - CT scan showed thickening in the stomach and colon therefore patient was referred for endoscopy and colonoscopy\par - s/p eval with Dr. 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 Currently on treatment for the ulcer with omeprazole. recommended to have MRCP- pt is reluctant to proceed. re-advised importance to follow up. \par - Ferritin remains elevated - likely elevated as acute phase reactant. Hgb 11.5 stable.  % 13 - borderline AWAIS, ESR/CRP elevated - element of inflammation.\par -repeat ferritin and iron studies today\par #weight loss \par - Weight stable today\par - Consider nutrition consult \par - s/p CT scan of chest abdomen and pelvis  showed thickening of stomach and colon \par - s/p EGD/CNY \par \par #tinnitus \par has ENT appointment in June 2023. \par \par #keratotic lesion\par point tenderness upon palpation x several months \par recommended derm eval- Dr. Belen Molina. Contact info given. \par \par RTC in 5 months

## 2023-05-23 NOTE — HISTORY OF PRESENT ILLNESS
[de-identified] : This is an 89-year-old woman presenting with worsening anemia.\par Patient was started on oral iron once a day by her primary care doctor.\par \par She reports black stools since starting iron in feb 2022 when found anemia.  No blood or black stool before that. No change in bowel habits\par Now has skinnier stools now .  No \par No pain or reflux in esophagus , no dysphagia  No nausea or vomiting \par Loosing weight - 6months , 20pounds . states that she  eating the same \par Feeling more SOB then usual also , about 6 months ago , sent pulmonary . Lungs ok. \par Even walking on flat surfaces. 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 , had fall and left leg - knee and hip also . \par \par Hx of right hip total replacement \par Total shoulder was recommended \par \par Just switched to dr jones , in feb 2022 , patient was never told anemic \par \par Had colonoscopy and egd  over 5 yrs ago \par Hx of esophageal problem with concern for monteiro , was doing feqent egd, before 2020. \par \par 3/2022- ct scan shows thickening in colon and stomach \par  [de-identified] : Patient presents for routine follow up for anemia. \par Remains on Omeprazole. \par Her Alk Phos remains elevated s/p US liver 9/2022 normal sonographic appearance of liver. CBD 10mm recmommend MRCP.\par followed up with Dr. Pringle for ongoing alk phos and GGT. - recommended MRCP. pt reluctant to do scans. \par has  appt with ENT in June. -> ears ringing and just "feels off" especially on left ear . - Dr. Merlos? \par has been having ongoing pain to left side of body where there is a skin lesoin.

## 2023-05-23 NOTE — PHYSICAL EXAM
[Thin] : thin [Normal] : affect appropriate [de-identified] : glasses.  [de-identified] : R ear with large amount of cerumen. left ear with minimal cerumen. TM pearly white. no external/internal erythema.  [de-identified] : beige keratotic lesion with irregular borders to left rib . mild point tenderness. there is also a beige keratotic lesion to LUQ abdomen aspect.

## 2023-05-24 LAB
FERRITIN SERPL-MCNC: 580 NG/ML
IRON SATN MFR SERPL: 15 %
IRON SERPL-MCNC: 33 UG/DL
TIBC SERPL-MCNC: 224 UG/DL
UIBC SERPL-MCNC: 191 UG/DL

## 2023-07-21 ENCOUNTER — NON-APPOINTMENT (OUTPATIENT)
Age: 88
End: 2023-07-21

## 2023-10-05 DIAGNOSIS — R79.89 OTHER SPECIFIED ABNORMAL FINDINGS OF BLOOD CHEMISTRY: ICD-10-CM

## 2023-10-12 ENCOUNTER — RESULT REVIEW (OUTPATIENT)
Age: 88
End: 2023-10-12

## 2023-10-13 ENCOUNTER — APPOINTMENT (OUTPATIENT)
Dept: GASTROENTEROLOGY | Facility: HOSPITAL | Age: 88
End: 2023-10-13
Payer: MEDICARE

## 2023-10-13 PROCEDURE — 43239 EGD BIOPSY SINGLE/MULTIPLE: CPT

## 2023-10-19 DIAGNOSIS — E55.9 VITAMIN D DEFICIENCY, UNSPECIFIED: ICD-10-CM

## 2023-10-19 DIAGNOSIS — D64.9 ANEMIA, UNSPECIFIED: ICD-10-CM

## 2023-10-24 ENCOUNTER — RESULT REVIEW (OUTPATIENT)
Age: 88
End: 2023-10-24

## 2023-10-24 ENCOUNTER — APPOINTMENT (OUTPATIENT)
Dept: HEMATOLOGY ONCOLOGY | Facility: CLINIC | Age: 88
End: 2023-10-24
Payer: MEDICARE

## 2023-10-24 VITALS
HEART RATE: 65 BPM | OXYGEN SATURATION: 96 % | DIASTOLIC BLOOD PRESSURE: 63 MMHG | SYSTOLIC BLOOD PRESSURE: 127 MMHG | HEIGHT: 64 IN | BODY MASS INDEX: 20.32 KG/M2 | WEIGHT: 119 LBS | TEMPERATURE: 98.2 F | RESPIRATION RATE: 18 BRPM

## 2023-10-24 PROCEDURE — 99213 OFFICE O/P EST LOW 20 MIN: CPT

## 2023-10-25 ENCOUNTER — APPOINTMENT (OUTPATIENT)
Dept: GASTROENTEROLOGY | Facility: CLINIC | Age: 88
End: 2023-10-25
Payer: MEDICARE

## 2023-10-25 VITALS
BODY MASS INDEX: 20.32 KG/M2 | WEIGHT: 119 LBS | SYSTOLIC BLOOD PRESSURE: 121 MMHG | RESPIRATION RATE: 16 BRPM | HEIGHT: 64 IN | DIASTOLIC BLOOD PRESSURE: 62 MMHG | OXYGEN SATURATION: 99 % | HEART RATE: 65 BPM

## 2023-10-25 DIAGNOSIS — K21.9 GASTRO-ESOPHAGEAL REFLUX DISEASE W/OUT ESOPHAGITIS: ICD-10-CM

## 2023-10-25 DIAGNOSIS — K22.70 BARRETT'S ESOPHAGUS W/OUT DYSPLASIA: ICD-10-CM

## 2023-10-25 DIAGNOSIS — K25.9 GASTRIC ULCER, UNSPECIFIED AS ACUTE OR CHRONIC, W/OUT HEMORRHAGE OR PERFORATION: ICD-10-CM

## 2023-10-25 PROCEDURE — 99214 OFFICE O/P EST MOD 30 MIN: CPT

## 2023-10-25 RX ORDER — OMEPRAZOLE 40 MG/1
40 CAPSULE, DELAYED RELEASE ORAL
Qty: 180 | Refills: 3 | Status: DISCONTINUED | COMMUNITY
Start: 2022-03-23 | End: 2023-10-25

## 2023-10-25 RX ORDER — OMEPRAZOLE 20 MG/1
20 CAPSULE, DELAYED RELEASE ORAL
Qty: 90 | Refills: 2 | Status: ACTIVE | COMMUNITY
Start: 2023-10-25 | End: 1900-01-01

## 2023-12-05 ENCOUNTER — NON-APPOINTMENT (OUTPATIENT)
Age: 88
End: 2023-12-05

## 2023-12-14 ENCOUNTER — RESULT REVIEW (OUTPATIENT)
Age: 88
End: 2023-12-14

## 2023-12-14 ENCOUNTER — APPOINTMENT (OUTPATIENT)
Dept: PAIN MANAGEMENT | Facility: CLINIC | Age: 88
End: 2023-12-14
Payer: MEDICARE

## 2023-12-14 VITALS
WEIGHT: 119 LBS | HEIGHT: 64 IN | SYSTOLIC BLOOD PRESSURE: 121 MMHG | DIASTOLIC BLOOD PRESSURE: 75 MMHG | BODY MASS INDEX: 20.32 KG/M2

## 2023-12-14 PROCEDURE — 99214 OFFICE O/P EST MOD 30 MIN: CPT

## 2023-12-14 NOTE — ASSESSMENT
[FreeTextEntry1] : >> Imaging and Other Studies   XR L Shoulder  >> Therapy and Other Modalities  consider PT   >> Medications  acetaminophen 650mg q8h prn pain (caution <3g daily)   >> Interventions   shoulder pain likely secondary to significant joint arthropathy demonstrated on imaging refractory to conservative treatments. Will schedule LEFT glenohumeral joint steroid injection r/b/a discussed   >> Consults   na  >> Discussion of Risks/Benefits/Alternatives    >Regarding any scheduled procedures:  In the event the patient is scheduled for a procedure,   I have discussed in detail with the patient that any interventional pain procedure is associated with potential risks.  The procedure may include an injection of steroids and potentially other medications (local anesthetic and normal saline) into the epidural space or surrounding tissue of the spine.  There are significant risks of this procedure which include and are not limited to infection, bleeding, worsening pain, dural puncture leading to postdural puncture headache, nerve damage, spinal cord injury, paralysis, stroke, and death.    There is a chance that the procedure does not improve their pain.    There are risks associated with the steroid being absorbed into the body systemically.  These include dysphoria, difficulty sleeping, mood swings and personality changes.  Premenopausal women may notice an irregularity in her menstrual cycle for 2-3 months following the injection.  Steroids can specifically affect patients with hypertension, diabetes, and peptic ulcers.  The procedure may cause a temporary increase in blood pressure and blood pressure, and may adversely affect a peptic ulcer.  Other, more rare complications, include avascular necrosis of joints, glaucoma and worsening of osteoporosis.   I have discussed the risks of the procedure at length with the patient, and the potential benefits of pain relief.  I have offered alternatives to the procedure.  All questions were answered.    The patient expressed understanding and wishes to proceed with the procedure.   >> Conclusion   The above diagnosis and treatment plan is medically reasonable and necessary based on the patient encounter There were no barriers to communication. Informed patient that I would be available for any additional questions. Patient was instructed to call with any worsening symptoms including severe pain, new numbness/weakness, or changes in the bowel/bladder function. Discussed role of nsaids in pain management and all relevant risks, if patient is continuing to require after 4 weeks the patient should f/u for alternative treatment. Instructed patient to maintain pain diary to monitor pain level, mobility, and function.   The referring provider was informed of the above diagnosis and treatment plan.

## 2023-12-14 NOTE — REVIEW OF SYSTEMS
[Muscle Pain] : muscle pain [Radiating Pain] : radiating pain [Joint Pain] : joint pain [Numbness] : numbness [Negative] : Heme/Lymph

## 2023-12-14 NOTE — REASON FOR VISIT
[Initial Consultation] : an initial pain management consultation [FreeTextEntry2] : Referred by Dr. Koo

## 2023-12-14 NOTE — HISTORY OF PRESENT ILLNESS
[Joint Pain] : joint  [___ yrs] : [unfilled] year(s) ago [Constant] : constant [3] : a minimum pain level of 3/10 [8] : a maximum pain level of 8/10 [Sharp] : sharp [Stabbing] : stabbing [Shooting] : shooting [Lifting] : lifting [Medications] : medications [Heat] : heat [FreeTextEntry1] : HPI     Ms. ALICIA LOU is a 89 year F on baby ASA with pmhx of a fib, bilateral total hip replacement (2017, 2021), left shoulder OA. C/o worsening left shoulder pain. Does not wish to have surgery due to complicated post op course during hip replacement. Pain impacting her quality of life. Tylenol with modest effect. Reports she had an ablation in the past with Dr Cline with transient relief. Denies any additional weakness, numbness, bowel/bladder dysfunction, history of bleeding disorders.   Previous and current pain medications/doses/effects: Tylenol     Previous Pain Treatments: Physical Therapy     Previous Pain Injections: left shoulder ablation     Previous Diagnostic Studies/Images: 4/21 XR SHOULDER LEFT (COMMON) Order#: [id number]    INDICATION: Left shoulder pain  TECHNIQUE: 3 views of the left shoulder  COMPARISON: None available  FINDINGS:  There is no fracture or dislocation. There are mild-to-moderate degenerative changes of the glenohumeral articulation is a slight joint space narrowing and marginal osteophytosis. There are mild degenerative changes of the acromioclavicular joint. Noting evidence for acute osteomyelitis. There is no focal soft tissue abnormality. No radiopaque foreign body.    IMPRESSION:  Moderate glenohumeral degenerative arthrosis     [FreeTextEntry7] : Left shoulder [FreeTextEntry4] : tylenol

## 2023-12-14 NOTE — PHYSICAL EXAM
[Normal] : Well developed, in no acute distress, alert and oriented to person, place and time [Normal muscle bulk without asymmetry] : normal muscle bulk without asymmetry [Torres] : positive Torres Test [Neer's] : positive Neer's Test [] : Motor: [Facet Tenderness] : no facet tenderness [Spurling] : negative Spurling's Test

## 2023-12-15 ENCOUNTER — NON-APPOINTMENT (OUTPATIENT)
Age: 88
End: 2023-12-15

## 2023-12-19 ENCOUNTER — APPOINTMENT (OUTPATIENT)
Dept: PAIN MANAGEMENT | Facility: CLINIC | Age: 88
End: 2023-12-19
Payer: MEDICARE

## 2023-12-19 VITALS
SYSTOLIC BLOOD PRESSURE: 136 MMHG | HEIGHT: 64 IN | DIASTOLIC BLOOD PRESSURE: 70 MMHG | WEIGHT: 119 LBS | BODY MASS INDEX: 20.32 KG/M2

## 2023-12-19 PROCEDURE — 20611 DRAIN/INJ JOINT/BURSA W/US: CPT | Mod: LT

## 2023-12-19 RX ADMIN — BUPIVACAINE HYDROCHLORIDE 0 %: 7.5 INJECTION, SOLUTION EPIDURAL; INTRACAUDAL; PERINEURAL at 00:00

## 2023-12-19 RX ADMIN — TRIAMCINOLONE ACETONIDE MG/ML: 10 INJECTION, SUSPENSION INTRA-ARTICULAR; INTRALESIONAL at 00:00

## 2023-12-21 ENCOUNTER — NON-APPOINTMENT (OUTPATIENT)
Age: 88
End: 2023-12-21

## 2024-01-02 ENCOUNTER — APPOINTMENT (OUTPATIENT)
Dept: PLASTIC SURGERY | Facility: CLINIC | Age: 89
End: 2024-01-02
Payer: MEDICARE

## 2024-01-02 ENCOUNTER — RESULT REVIEW (OUTPATIENT)
Age: 89
End: 2024-01-02

## 2024-01-02 PROCEDURE — 13121 CMPLX RPR S/A/L 2.6-7.5 CM: CPT

## 2024-01-02 PROCEDURE — 11646 EXC F/E/E/N/L MAL+MRG >4 CM: CPT

## 2024-01-02 PROCEDURE — 13122 CMPLX RPR S/A/L ADDL 5 CM/>: CPT

## 2024-01-02 NOTE — PROCEDURE
[Nl] : None [FreeTextEntry1] : left forearm skin lesion [FreeTextEntry2] : excision of left forearm skin lesion [FreeTextEntry3] : 10 cc lidocaine with epi [FreeTextEntry6] : Nature of procedure, risks, benifits, alternatives, reviewed. If malignancy will require possible re excision.  Area for excision marked as a 4x8 cm spindle. 10 cc lidocaine with epi injected. Skin prepped and draped. Lesion excised. Hemostasis obtained with cautery. Wound closed with 3-0 and 4-0 moonocryl sutures. Epidermis and dermis very thin so closed with 5-0 fast absorbing surture. Dressing applied.  Tissue sent to pathology. Follow up in 2-3 weeks. [FreeTextEntry7] : left forearm skin lesion

## 2024-01-02 NOTE — HISTORY OF PRESENT ILLNESS
[FreeTextEntry1] : 88 y/o female presents for initial consultation referred by Dr. Packer for the removal of skin lesion on the left forearm. NO biopsy was done. Patient reports no pain. Lesion present fro several months. Lesion is a 2cm black nodule.  Despite history that suggests a possible traumatic originn I am concerned about the possibility of a skin malignancy. Recommed excisional biopsy.

## 2024-01-04 ENCOUNTER — APPOINTMENT (OUTPATIENT)
Dept: PAIN MANAGEMENT | Facility: CLINIC | Age: 89
End: 2024-01-04
Payer: MEDICARE

## 2024-01-04 VITALS
WEIGHT: 119 LBS | DIASTOLIC BLOOD PRESSURE: 78 MMHG | SYSTOLIC BLOOD PRESSURE: 147 MMHG | BODY MASS INDEX: 20.32 KG/M2 | OXYGEN SATURATION: 100 % | HEART RATE: 60 BPM | HEIGHT: 64 IN

## 2024-01-04 DIAGNOSIS — G89.4 CHRONIC PAIN SYNDROME: ICD-10-CM

## 2024-01-04 DIAGNOSIS — M79.2 NEURALGIA AND NEURITIS, UNSPECIFIED: ICD-10-CM

## 2024-01-04 DIAGNOSIS — M19.012 PRIMARY OSTEOARTHRITIS, LEFT SHOULDER: ICD-10-CM

## 2024-01-04 DIAGNOSIS — M79.18 MYALGIA, OTHER SITE: ICD-10-CM

## 2024-01-04 DIAGNOSIS — M54.12 RADICULOPATHY, CERVICAL REGION: ICD-10-CM

## 2024-01-04 PROCEDURE — G2211 COMPLEX E/M VISIT ADD ON: CPT

## 2024-01-04 PROCEDURE — 99214 OFFICE O/P EST MOD 30 MIN: CPT

## 2024-01-04 NOTE — PHYSICAL EXAM
[Normal muscle bulk without asymmetry] : normal muscle bulk without asymmetry [Facet Tenderness] : no facet tenderness [Torres] : positive Torres Test [Neer's] : positive Neer's Test [] : Motor: [Normal] : Normal affect

## 2024-01-04 NOTE — HISTORY OF PRESENT ILLNESS
[Joint Pain] : joint  [___ yrs] : [unfilled] year(s) ago [Constant] : constant [3] : a minimum pain level of 3/10 [8] : a maximum pain level of 8/10 [Sharp] : sharp [Stabbing] : stabbing [Shooting] : shooting [Lifting] : lifting [Medications] : medications [Heat] : heat [FreeTextEntry1] : Interval Note:  sp LEFT glenohumeral joint steroid injection with improvement in left shoulder pain.  Of note, patient saw Dr. Moncada who removed lesion on left elbow.  as well.  Since last visit the pain is improved. Denies any additional weakness, numbness, bowel/bladder dysfunction.  Continues PRN tylenol.   HPI     Ms. ALICIA LOU is a 89 year F on baby ASA with pmhx of a fib, bilateral total hip replacement (2017, 2021), left shoulder OA. C/o worsening left shoulder pain. Does not wish to have surgery due to complicated post op course during hip replacement. Pain impacting her quality of life. Tylenol with modest effect. Reports she had an ablation in the past with Dr Cline with transient relief. Denies any additional weakness, numbness, bowel/bladder dysfunction, history of bleeding disorders.   Previous and current pain medications/doses/effects: Tylenol     Previous Pain Treatments: Physical Therapy     Previous Pain Injections:  LEFT glenohumeral joint steroid injection 12/19/23 left shoulder ablation     Previous Diagnostic Studies/Images:  XR 12/23  Severe osteoarthritic sclerotic glenohumeral joint space narrowing with chondral erosive changes and subchondral cysts.  The humeral head is located within glenoid fossa.  Moderate AC joint arthrosis with undersurface spurring.  No pathologic calcifications or soft tissue abnormalities.  The visualized lung upper zone and ribs are within normal limits..   IMPRESSION:   Severe LEFT glenohumeral joint space osteoarthritic sclerotic narrowing, significantly progressed compared to prior 4/24/2021 LEFT shoulder radiograph  4/21 XR SHOULDER LEFT (COMMON) Order#: [id number]    INDICATION: Left shoulder pain  TECHNIQUE: 3 views of the left shoulder  COMPARISON: None available  FINDINGS:  There is no fracture or dislocation. There are mild-to-moderate degenerative changes of the glenohumeral articulation is a slight joint space narrowing and marginal osteophytosis. There are mild degenerative changes of the acromioclavicular joint. Noting evidence for acute osteomyelitis. There is no focal soft tissue abnormality. No radiopaque foreign body.    IMPRESSION:  Moderate glenohumeral degenerative arthrosis     [FreeTextEntry7] : Left shoulder [FreeTextEntry4] : tylenol

## 2024-01-04 NOTE — ASSESSMENT
[FreeTextEntry1] : >> Imaging and Other Studies   XR L Shoulder  >> Therapy and Other Modalities  PT   >> Medications  acetaminophen 650mg q8h prn pain (caution <3g daily)   >> Interventions   shoulder pain likely secondary to significant joint arthropathy demonstrated on imaging refractory to conservative treatments. sp  LEFT glenohumeral joint steroid injection  >> Consults   na  >> Discussion of Risks/Benefits/Alternatives    >Regarding any scheduled procedures:  In the event the patient is scheduled for a procedure,   I have discussed in detail with the patient that any interventional pain procedure is associated with potential risks.  The procedure may include an injection of steroids and potentially other medications (local anesthetic and normal saline) into the epidural space or surrounding tissue of the spine.  There are significant risks of this procedure which include and are not limited to infection, bleeding, worsening pain, dural puncture leading to postdural puncture headache, nerve damage, spinal cord injury, paralysis, stroke, and death.    There is a chance that the procedure does not improve their pain.    There are risks associated with the steroid being absorbed into the body systemically.  These include dysphoria, difficulty sleeping, mood swings and personality changes.  Premenopausal women may notice an irregularity in her menstrual cycle for 2-3 months following the injection.  Steroids can specifically affect patients with hypertension, diabetes, and peptic ulcers.  The procedure may cause a temporary increase in blood pressure and blood pressure, and may adversely affect a peptic ulcer.  Other, more rare complications, include avascular necrosis of joints, glaucoma and worsening of osteoporosis.   I have discussed the risks of the procedure at length with the patient, and the potential benefits of pain relief.  I have offered alternatives to the procedure.  All questions were answered.    The patient expressed understanding and wishes to proceed with the procedure.   >> Conclusion   The above diagnosis and treatment plan is medically reasonable and necessary based on the patient encounter There were no barriers to communication. Informed patient that I would be available for any additional questions. Patient was instructed to call with any worsening symptoms including severe pain, new numbness/weakness, or changes in the bowel/bladder function. Discussed role of nsaids in pain management and all relevant risks, if patient is continuing to require after 4 weeks the patient should f/u for alternative treatment. Instructed patient to maintain pain diary to monitor pain level, mobility, and function.

## 2024-01-05 ENCOUNTER — TRANSCRIPTION ENCOUNTER (OUTPATIENT)
Age: 89
End: 2024-01-05

## 2024-01-16 ENCOUNTER — NON-APPOINTMENT (OUTPATIENT)
Age: 89
End: 2024-01-16

## 2024-01-17 ENCOUNTER — APPOINTMENT (OUTPATIENT)
Dept: BREAST CENTER | Facility: CLINIC | Age: 89
End: 2024-01-17
Payer: MEDICARE

## 2024-01-17 VITALS
SYSTOLIC BLOOD PRESSURE: 95 MMHG | DIASTOLIC BLOOD PRESSURE: 59 MMHG | WEIGHT: 120 LBS | OXYGEN SATURATION: 98 % | HEART RATE: 65 BPM | BODY MASS INDEX: 20.49 KG/M2 | HEIGHT: 64 IN

## 2024-01-17 PROCEDURE — 99204 OFFICE O/P NEW MOD 45 MIN: CPT

## 2024-01-17 NOTE — REASON FOR VISIT
[FreeTextEntry1] : Dr. Packer [Follow-Up: _____] : a [unfilled] follow-up visit [Initial Evaluation] : an initial evaluation

## 2024-01-17 NOTE — HISTORY OF PRESENT ILLNESS
[FreeTextEntry1] : 89 y/o female presents for initial consultation referred by Dr. Packer for the removal of skin lesion on the left forearm. NO biopsy was done. Patient reports no pain. Lesion present for several months. Lesion is a 2cm black nodule.  Despite history that suggests a possible traumatic origin I am concerned about the possibility of a skin malignancy. recommend excisional biopsy.  Patient is here today to finalize plans for removal of skin lesion?

## 2024-01-17 NOTE — PHYSICAL EXAM
[No Supraclavicular Adenopathy] : no supraclavicular adenopathy [No Cervical Adenopathy] : no cervical adenopathy [Clear to Auscultation Bilat] : clear to auscultation bilaterally [No Axillary Lymphadenopathy] : no left axillary lymphadenopathy [de-identified] : In the dorsum of the upper left forearm is a 6 cm well-healed vertical scar.  No pigmented lesion or satellite lesions.

## 2024-01-17 NOTE — PAST MEDICAL HISTORY
[Menopause Age____] : age at menopause was [unfilled] [Total Preg ___] : G[unfilled] [Live Births ___] : P[unfilled]  [Premature ___] : Premature: [unfilled] [History of Hormone Replacement Treatment] : has no history of hormone replacement treatment

## 2024-01-17 NOTE — REVIEW OF SYSTEMS
[Negative] : Heme/Lymph [Feeling Tired] : feeling tired [Shortness Of Breath] : shortness of breath [Joint Stiffness] : joint stiffness [Limb Pain] : limb pain [Confused] : confusion [Limb Weakness] : limb weakness

## 2024-01-17 NOTE — HISTORY OF PRESENT ILLNESS
[FreeTextEntry1] : 89-year-old postmenopausal woman presents with a pigmented lesion in the left forearm that recently was irritated by a fall and was noted to be large and raised very consistent with a blood blister.  Patient went to the dermatologist who sent her to the plastic surgeon who did a wide excision with 2 mm margins which revealed a 15 mm melanoma without ulceration but also without any epidermis suggesting that there might be a chance that this is a spindle melanoma versus a metastatic lesion.  Patient has no other lesions noted although she did not get a full skin check by the dermatologist.  Patient otherwise feels well and has had no skin lesions excised before in the past.  Patient has a family history of breast colon and prostate cancer.

## 2024-01-17 NOTE — REASON FOR VISIT
[Consultation] : a consultation visit [FreeTextEntry1] : Left forearm melanoma w/ steady gait/Walking

## 2024-01-23 ENCOUNTER — APPOINTMENT (OUTPATIENT)
Dept: PLASTIC SURGERY | Facility: CLINIC | Age: 89
End: 2024-01-23

## 2024-01-24 ENCOUNTER — RESULT REVIEW (OUTPATIENT)
Age: 89
End: 2024-01-24

## 2024-01-29 ENCOUNTER — NON-APPOINTMENT (OUTPATIENT)
Age: 89
End: 2024-01-29

## 2024-01-29 DIAGNOSIS — R59.0 LOCALIZED ENLARGED LYMPH NODES: ICD-10-CM

## 2024-01-31 ENCOUNTER — RESULT REVIEW (OUTPATIENT)
Age: 89
End: 2024-01-31

## 2024-02-01 ENCOUNTER — RESULT REVIEW (OUTPATIENT)
Age: 89
End: 2024-02-01

## 2024-02-06 ENCOUNTER — APPOINTMENT (OUTPATIENT)
Dept: PAIN MANAGEMENT | Facility: CLINIC | Age: 89
End: 2024-02-06

## 2024-02-06 ENCOUNTER — NON-APPOINTMENT (OUTPATIENT)
Age: 89
End: 2024-02-06

## 2024-02-08 ENCOUNTER — NON-APPOINTMENT (OUTPATIENT)
Age: 89
End: 2024-02-08

## 2024-03-06 ENCOUNTER — RESULT REVIEW (OUTPATIENT)
Age: 89
End: 2024-03-06

## 2024-03-06 ENCOUNTER — APPOINTMENT (OUTPATIENT)
Dept: BREAST CENTER | Facility: HOSPITAL | Age: 89
End: 2024-03-06

## 2024-03-08 ENCOUNTER — TRANSCRIPTION ENCOUNTER (OUTPATIENT)
Age: 89
End: 2024-03-08

## 2024-03-19 ENCOUNTER — APPOINTMENT (OUTPATIENT)
Dept: PLASTIC SURGERY | Facility: CLINIC | Age: 89
End: 2024-03-19
Payer: MEDICARE

## 2024-03-19 ENCOUNTER — APPOINTMENT (OUTPATIENT)
Dept: BREAST CENTER | Facility: CLINIC | Age: 89
End: 2024-03-19

## 2024-03-19 ENCOUNTER — NON-APPOINTMENT (OUTPATIENT)
Age: 89
End: 2024-03-19

## 2024-03-19 PROCEDURE — 99024 POSTOP FOLLOW-UP VISIT: CPT

## 2024-03-20 NOTE — HISTORY OF PRESENT ILLNESS
[FreeTextEntry1] : 89 y/o female presents for a follow up visit, s/p left forearm skin lesion removal on 3/6/24. Denies any f/c/n/v. Reports taking Tylenol for pain.  Left forearm incision site healing well, no erythema or infections  small area (8mmx 1 mm) of breakdown to mid-incision  A/P:  dry gauze dressing changes daily for 1 week  ok to shower  follow up in 1 month or PRN

## 2024-03-26 ENCOUNTER — APPOINTMENT (OUTPATIENT)
Dept: HEMATOLOGY ONCOLOGY | Facility: CLINIC | Age: 89
End: 2024-03-26

## 2024-05-02 ENCOUNTER — LABORATORY RESULT (OUTPATIENT)
Age: 89
End: 2024-05-02

## 2024-05-02 ENCOUNTER — APPOINTMENT (OUTPATIENT)
Dept: HEMATOLOGY ONCOLOGY | Facility: CLINIC | Age: 89
End: 2024-05-02
Payer: MEDICARE

## 2024-05-02 ENCOUNTER — RESULT REVIEW (OUTPATIENT)
Age: 89
End: 2024-05-02

## 2024-05-02 VITALS
HEART RATE: 79 BPM | BODY MASS INDEX: 21.51 KG/M2 | DIASTOLIC BLOOD PRESSURE: 58 MMHG | HEIGHT: 64 IN | SYSTOLIC BLOOD PRESSURE: 104 MMHG | WEIGHT: 126 LBS | OXYGEN SATURATION: 96 % | TEMPERATURE: 98.6 F | RESPIRATION RATE: 16 BRPM

## 2024-05-02 DIAGNOSIS — R59.0 LOCALIZED ENLARGED LYMPH NODES: ICD-10-CM

## 2024-05-02 PROCEDURE — 99215 OFFICE O/P EST HI 40 MIN: CPT

## 2024-05-02 RX ORDER — LIDOCAINE 40 MG/G
4 PATCH TOPICAL
Qty: 60 | Refills: 2 | Status: COMPLETED | COMMUNITY
Start: 2022-09-12 | End: 2024-05-02

## 2024-05-02 RX ORDER — ASPIRIN 81 MG/1
81 TABLET, COATED ORAL
Refills: 0 | Status: COMPLETED | COMMUNITY
End: 2024-05-02

## 2024-05-02 NOTE — ASSESSMENT
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 Klein and Dr. Moncada reviewed PET CT= - Hypermetabolic left axillary and subpectoral nodes, possibly representing metastatic disease. Mildly FDG avid left supraclavicular node, possibly representing metastatic disease. L axillary and supraclav node reactive and negative for malignancy discussed keytruda - Reviewed side effects. she expresses understanding and wishes to proceed check MRI brain vs and labs reviewed. wbc 6.38, hgb 11.6, plts wnl. elevated ESR/CRP - Element of inflammation. electrolytes, kidneys and liver wnl.,  Reviewed NCCN guidelines H and p evyer 3-6 months for 2 years then 3-12 mon for 3 years then annually. consider imaging every 3-12 months for 3 years and then every 6-12 months for another 3 years.CT C/A/P with IV contrast or whole body pet ct, brain MRI, karen basin US.  #anemia - resolved - 2/2 to AWAIS  -hgb 11.6. stable  #iron def anemia  - s/p eval with Dr. 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 Currently on treatment for the ulcer with omeprazole. recommended to have MRCP- pt is reluctant to proceed. re-advised importance to follow up - pending iron and ferritin.  -repeat EGD 10/13/23 -Negative for dysplasia. follow with Dr. Pringle. Also, hiatal hernia noted  #weight loss  - Weight stable today - s/p CT scan of chest abdomen and pelvis  showed thickening of stomach and colon  - s/p EGD/CNY   #  Enlarged, heterogeneous, hypermetabolic left thyroid lobe, suspicious for malignancy. Biopsy is recommended. check thyroid US and biopsy if necessary  #FDG avid 3.0 x 5.7 cm periarticular fluid collection centered below the left glenohumeral joint, probably representing bursitis. Correlate clinically. Consider fluid sampling if there is concern for infection or malignancy. can send to ortho in future if worsens  #2.2 cm minimally FDG avid lucent lesion at the left humeral head, possibly a cyst.  RTC in 3 weeks- cbc with diff, cmp, iron, ferritin, b12, folate, ESR/CRP. acth, cortisol, TSH, FT4. [AdvancecareDate] : 05/02/2024 [FreeTextEntry2] : Will bring copy

## 2024-05-02 NOTE — REVIEW OF SYSTEMS
[Joint Stiffness] : joint stiffness [FreeTextEntry4] : see interval hx  [FreeTextEntry9] : chronic hip pain. not worse. had hip surgery in may 2021.  [de-identified] : see interval hx  [Negative] : Integumentary [Dysphagia] : no dysphagia [Loss of Hearing] : no loss of hearing [Hoarseness] : no hoarseness [Odynophagia] : no odynophagia [Mucosal Pain] : no mucosal pain [FreeTextEntry2] : energy stable. not worse. weight stable.  [FreeTextEntry7] : no bowel changes

## 2024-05-02 NOTE — HISTORY OF PRESENT ILLNESS
[de-identified] : This is an 89-year-old woman presenting with worsening anemia.\par  Patient was started on oral iron once a day by her primary care doctor.\par  \par  She reports black stools since starting iron in feb 2022 when found anemia.  No blood or black stool before that. No change in bowel habits\par  Now has skinnier stools now .  No \par  No pain or reflux in esophagus , no dysphagia  No nausea or vomiting \par  Loosing weight - 6months , 20pounds . states that she  eating the same \par  Feeling more SOB then usual also , about 6 months ago , sent pulmonary . Lungs ok. \par  Even walking on flat surfaces. 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 , had fall and left leg - knee and hip also . \par  \par  Hx of right hip total replacement \par  Total shoulder was recommended \par  \par  Just switched to dr jones , in feb 2022 , patient was never told anemic \par  \par  Had colonoscopy and egd  over 5 yrs ago \par  Hx of esophageal problem with concern for monteiro , was doing feqent egd, before 2020. \par  \par  3/2022- ct scan shows thickening in colon and stomach \par   [de-identified] : Patient presents for routine follow up Recent Melanoma removal on left FA 4/9/2024 Reports feeling well besides fatigue

## 2024-05-02 NOTE — PHYSICAL EXAM
[Thin] : thin [Normal] : affect appropriate [de-identified] : glasses.  [de-identified] : R ear with large amount of cerumen. left ear with minimal cerumen. TM pearly white. no external/internal erythema.  [de-identified] : beige keratotic lesion with irregular borders to left rib . mild point tenderness. there is also a beige keratotic lesion to LUQ abdomen aspect.

## 2024-05-22 ENCOUNTER — RESULT REVIEW (OUTPATIENT)
Age: 89
End: 2024-05-22

## 2024-05-23 ENCOUNTER — RESULT REVIEW (OUTPATIENT)
Age: 89
End: 2024-05-23

## 2024-05-23 ENCOUNTER — LABORATORY RESULT (OUTPATIENT)
Age: 89
End: 2024-05-23

## 2024-05-23 ENCOUNTER — APPOINTMENT (OUTPATIENT)
Dept: HEMATOLOGY ONCOLOGY | Facility: CLINIC | Age: 89
End: 2024-05-23
Payer: MEDICARE

## 2024-05-23 VITALS
SYSTOLIC BLOOD PRESSURE: 119 MMHG | BODY MASS INDEX: 21.65 KG/M2 | TEMPERATURE: 98.3 F | HEIGHT: 63 IN | WEIGHT: 122.2 LBS | RESPIRATION RATE: 18 BRPM | HEART RATE: 98 BPM | OXYGEN SATURATION: 96 % | DIASTOLIC BLOOD PRESSURE: 73 MMHG

## 2024-05-23 DIAGNOSIS — E61.1 IRON DEFICIENCY: ICD-10-CM

## 2024-05-23 DIAGNOSIS — R74.8 ABNORMAL LEVELS OF OTHER SERUM ENZYMES: ICD-10-CM

## 2024-05-23 PROCEDURE — 99215 OFFICE O/P EST HI 40 MIN: CPT

## 2024-06-09 PROBLEM — R74.8 ALKALINE PHOSPHATASE ELEVATION: Status: ACTIVE | Noted: 2022-09-06

## 2024-06-09 PROBLEM — E61.1 IRON DEFICIENCY: Status: ACTIVE | Noted: 2022-03-03

## 2024-06-09 NOTE — REVIEW OF SYSTEMS
[Joint Stiffness] : joint stiffness [Negative] : Allergic/Immunologic [Dysphagia] : no dysphagia [Loss of Hearing] : no loss of hearing [Hoarseness] : no hoarseness [Odynophagia] : no odynophagia [Mucosal Pain] : no mucosal pain [Lower Ext Edema] : lower extremity edema [FreeTextEntry7] : no bowel changes  [FreeTextEntry2] : energy stable. not worse. weight stable.

## 2024-06-09 NOTE — PHYSICAL EXAM
[Thin] : thin [de-identified] : R ear with large amount of cerumen. left ear with minimal cerumen. TM pearly white. no external/internal erythema.  [Restricted in physically strenuous activity but ambulatory and able to carry out work of a light or sedentary nature] : Status 1- Restricted in physically strenuous activity but ambulatory and able to carry out work of a light or sedentary nature, e.g., light house work, office work [Normal] : pharynx is unremarkable, moist mucus membrane, no oral lesions [de-identified] : glasses.  [de-identified] : L leg withut redness or swelling, vascular changes, R leg mild swelling no redness, discoloration [de-identified] : beige keratotic lesion with irregular borders to left rib . mild point tenderness. there is also a beige keratotic lesion to LUQ abdomen aspect.

## 2024-06-09 NOTE — ASSESSMENT
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 Klein and Dr. Moncada - Reviewed PET CT= - Hypermetabolic left axillary and subpectoral nodes, possibly representing metastatic disease. Mildly FDG avid left supraclavicular node, possibly representing metastatic disease. - L axillary and supraclav node reactive and negative for malignancy discussed keytruda - Reviewed side effects. she expresses understanding and wishes to proceed - Check MRI brain - 5/2/24 vs and labs reviewed. wbc 6.38, hgb 11.6, plts wnl. elevated ESR/CRP - Element of inflammation. electrolytes, kidneys and liver wnl.,  Reviewed NCCN guidelines H and p evyer 3-6 months for 2 years then 3-12 mon for 3 years then annually. consider imaging every 3-12 months for 3 years and then every 6-12 months for another 3 years. CT C/A/P with IV contrast or whole body pet ct, brain MRI, karen basin US. - 5/23/24 vs and CBC reviewed; WBC 6.25, hgb 11.3, plt 328, AST ALT wnl alk phos 200s. Pending MRI brain. Reminded to complete work up. s/p C1 keytruda. Due today. Proceed.   #Leg Swelling  - Does note swelling to the R leg and discoloration to the L leg. Saw cardio yesterday was told this is age related vascular changes. Did noticed scabbed small cut to R leg 2 weeks ago. No redness. Low concern for cellulitis. Check Venous and arterial dopplers STAT   #Anemia  - 2/2 to AWAIS  -hgb 11.3. stable - 5/23/24 ferritin from last visit elevated likely 2/2 acute phase reactant. %sat 21. Repeat today. May require iron infusions in future   #AWAIS - s/p eval with Dr. 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 Currently on treatment for the ulcer with omeprazole. recommended to have MRCPpt is reluctant to proceed. re-advised importance to follow up - repeat EGD 10/13/23 Negative for dysplasia. follow with Dr. Pringle. Also, hiatal hernia noted - 5/23/24 ferritin from last visit elevated likely 2/2 acute phase reactant. %sat 21. Repeat today. May require iron infusions in future   #Weight loss  - Weight stable today - s/p CT scan of chest abdomen and pelvis  showed thickening of stomach and colon  - s/p EGD/CNY   #Enlarged, heterogeneous, hypermetabolic left thyroid lobe, suspicious for malignancy. Biopsy is recommended. - Check thyroid US and biopsy if necessary  #FDG avid 3.0 x 5.7 cm periarticular fluid collection centered below the left glenohumeral joint, probably representing bursitis. Correlate clinically. Consider fluid sampling if there is concern for infection or malignancy. - Can send to ortho in future if worsens  #2.2 cm minimally FDG avid lucent lesion at the left humeral head, possibly a cyst.  RTC in 3 weeks- cbc with diff, cmp, iron, ferritin, b12, folate, ESR/CRP. acth, cortisol, TSH, FT4 LDH   Case and management discussed with Dr. celestin  [FreeTextEntry2] : Will bring copy [AdvancecareDate] : 05/02/2024

## 2024-06-09 NOTE — HISTORY OF PRESENT ILLNESS
[de-identified] : This is an 89-year-old woman presenting with worsening anemia. Patient was started on oral iron once a day by her primary care doctor.  She reports black stools since starting iron in feb 2022 when found anemia.  No blood or black stool before that. No change in bowel habits Now has skinnier stools now .  No  No pain or reflux in esophagus , no dysphagia  No nausea or vomiting  Loosing weight - 6months , 20pounds . states that she  eating the same  Feeling more SOB then usual also , about 6 months ago , sent pulmonary . Lungs ok.  Even walking on flat surfaces. 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 , had fall and left leg - knee and hip also .   Hx of right hip total replacement  Total shoulder was recommended   Just switched to dr jones , in feb 2022 , patient was never told anemic   Had colonoscopy and egd  over 5 yrs ago  Hx of esophageal problem with concern for monteiro , was doing feqent egd, before 2020.   3/2022- ct scan shows thickening in colon and stomach   [de-identified] : Patient presents for routine follow up. Recent Melanoma removal on left FA 4/9/2024. Started Keytruda 3 weeks ago planned to continue for 1 year. Tolerated last dose well. No itching, fevers, n/v/d. Does not swelling to the R leg and discoloration to the L leg. Saw cardio yesterday was told this is age related vascular changes. Did noticed scabbed small cut to R leg 2 weeks ago. No redness.

## 2024-06-12 ENCOUNTER — LABORATORY RESULT (OUTPATIENT)
Age: 89
End: 2024-06-12

## 2024-06-13 ENCOUNTER — LABORATORY RESULT (OUTPATIENT)
Age: 89
End: 2024-06-13

## 2024-06-13 ENCOUNTER — RESULT REVIEW (OUTPATIENT)
Age: 89
End: 2024-06-13

## 2024-06-13 ENCOUNTER — APPOINTMENT (OUTPATIENT)
Dept: HEMATOLOGY ONCOLOGY | Facility: CLINIC | Age: 89
End: 2024-06-13

## 2024-06-13 ENCOUNTER — APPOINTMENT (OUTPATIENT)
Dept: HEMATOLOGY ONCOLOGY | Facility: CLINIC | Age: 89
End: 2024-06-13
Payer: MEDICARE

## 2024-06-13 VITALS
DIASTOLIC BLOOD PRESSURE: 71 MMHG | WEIGHT: 125 LBS | HEIGHT: 63 IN | TEMPERATURE: 98 F | RESPIRATION RATE: 18 BRPM | HEART RATE: 72 BPM | BODY MASS INDEX: 22.15 KG/M2 | SYSTOLIC BLOOD PRESSURE: 125 MMHG | OXYGEN SATURATION: 98 %

## 2024-06-13 DIAGNOSIS — M79.89 OTHER SPECIFIED SOFT TISSUE DISORDERS: ICD-10-CM

## 2024-06-13 PROCEDURE — 99214 OFFICE O/P EST MOD 30 MIN: CPT

## 2024-06-13 RX ORDER — METOPROLOL SUCCINATE 200 MG/1
TABLET, EXTENDED RELEASE ORAL
Refills: 0 | Status: COMPLETED | COMMUNITY
End: 2024-06-13

## 2024-06-13 RX ORDER — METOPROLOL TARTRATE 25 MG/1
25 TABLET, FILM COATED ORAL
Refills: 0 | Status: COMPLETED | COMMUNITY
End: 2024-06-13

## 2024-06-13 NOTE — ASSESSMENT
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 Klein and Dr. Moncada reviewed PET CT= - Hypermetabolic left axillary and subpectoral nodes, possibly representing metastatic disease. Mildly FDG avid left supraclavicular node, possibly representing metastatic disease. L axillary and supraclav node reactive and negative for malignancy discussed keytruda - Reviewed side effects. she expresses understanding and wishes to proceed check MRI brain vs and labs reviewed. wbc 6.38, hgb 11.6, plts wnl. elevated ESR/CRP - Element of inflammation. electrolytes, kidneys and liver wnl.,  Reviewed NCCN guidelines H and p evyer 3-6 months for 2 years then 3-12 mon for 3 years then annually. consider imaging every 3-12 months for 3 years and then every 6-12 months for another 3 years.CT C/A/P with IV contrast or whole body pet ct, brain MRI, karen basin US. hold keytruda today  #anemia - resolved - 2/2 to AWAIS  -hgb 11.6. stable  #iron def anemia  - s/p eval with Dr. 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 Currently on treatment for the ulcer with omeprazole. recommended to have MRCP- pt is reluctant to proceed. re-advised importance to follow up - pending iron and ferritin.  -repeat EGD 10/13/23 -Negative for dysplasia. follow with Dr. Pringle. Also, hiatal hernia noted  #weight loss  - Weight stable today - s/p CT scan of chest abdomen and pelvis  showed thickening of stomach and colon  - s/p EGD/[facility]   #  Enlarged, heterogeneous, hypermetabolic left thyroid lobe, suspicious for malignancy. Biopsy is recommended. check thyroid US and biopsy if necessary  #FDG avid 3.0 x 5.7 cm periarticular fluid collection centered below the left glenohumeral joint, probably representing bursitis. Correlate clinically. Consider fluid sampling if there is concern for infection or malignancy. can send to ortho in future if worsens  #2.2 cm minimally FDG avid lucent lesion at the left humeral head, possibly a cyst.  #LE swelling - US neg for DVT  RTC in 3 weeks- cbc with diff, cmp, iron, ferritin, b12, folate, ESR/CRP. acth, cortisol, TSH, FT4. [AdvancecareDate] : 05/02/2024 [FreeTextEntry2] : Will bring copy

## 2024-06-13 NOTE — ASSESSMENT
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 Klein and Dr. Moncada - Reviewed PET CT= - Hypermetabolic left axillary and subpectoral nodes, possibly representing metastatic disease. Mildly FDG avid left supraclavicular node, possibly representing metastatic disease. - L axillary and supraclav node reactive and negative for malignancy discussed keytruda - Reviewed side effects. she expresses understanding and wishes to proceed - Check MRI brain - 5/2/24 vs and labs reviewed. wbc 6.38, hgb 11.6, plts wnl. elevated ESR/CRP - Element of inflammation. electrolytes, kidneys and liver wnl.,  Reviewed NCCN guidelines H and p evyer 3-6 months for 2 years then 3-12 mon for 3 years then annually. consider imaging every 3-12 months for 3 years and then every 6-12 months for another 3 years. CT C/A/P with IV contrast or whole body pet ct, brain MRI, [place] US. - 5/23/24 vs and CBC reviewed; WBC 6.25, hgb 11.3, plt 328, AST ALT wnl alk phos 200s. Pending MRI brain. Reminded to complete work up. s/p C1 keytruda. Due today. Proceed.   #Leg Swelling  - Does note swelling to the R leg and discoloration to the L leg. Saw cardio yesterday was told this is age related vascular changes. Did noticed scabbed small cut to R leg 2 weeks ago. No redness. Low concern for cellulitis. Check Venous and arterial dopplers STAT   #Anemia  - 2/2 to AWAIS  -hgb 11.3. stable - 5/23/24 ferritin from last visit elevated likely 2/2 acute phase reactant. %sat 21. Repeat today. May require iron infusions in future   #AWAIS - s/p eval with Dr. 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 Currently on treatment for the ulcer with omeprazole. recommended to have MRCPpt is reluctant to proceed. re-advised importance to follow up - repeat EGD 10/13/23 Negative for dysplasia. follow with Dr. Pringle. Also, hiatal hernia noted - 5/23/24 ferritin from last visit elevated likely 2/2 acute phase reactant. %sat 21. Repeat today. May require iron infusions in future   #Weight loss  - Weight stable today - s/p CT scan of chest abdomen and pelvis  showed thickening of stomach and colon  - s/p EGD/[facility]   #Enlarged, heterogeneous, hypermetabolic left thyroid lobe, suspicious for malignancy. Biopsy is recommended. - Check thyroid US and biopsy if necessary  #FDG avid 3.0 x 5.7 cm periarticular fluid collection centered below the left glenohumeral joint, probably representing bursitis. Correlate clinically. Consider fluid sampling if there is concern for infection or malignancy. - Can send to ortho in future if worsens  #2.2 cm minimally FDG avid lucent lesion at the left humeral head, possibly a cyst.  RTC in 3 weeks- cbc with diff, cmp, iron, ferritin, b12, folate, ESR/CRP. acth, cortisol, TSH, FT4 LDH   Case and management discussed with Dr. celestin  [AdvancecareDate] : 05/02/2024 [FreeTextEntry2] : Will bring copy

## 2024-06-13 NOTE — PHYSICAL EXAM
[Restricted in physically strenuous activity but ambulatory and able to carry out work of a light or sedentary nature] : Status 1- Restricted in physically strenuous activity but ambulatory and able to carry out work of a light or sedentary nature, e.g., light house work, office work [Thin] : thin [Normal] : affect appropriate [de-identified] : glasses.  [de-identified] : L leg withut redness or swelling, vascular changes, R leg mild swelling no redness, discoloration [de-identified] : beige keratotic lesion with irregular borders to left rib . mild point tenderness. there is also a beige keratotic lesion to LUQ abdomen aspect.

## 2024-06-13 NOTE — PHYSICAL EXAM
[Thin] : thin [Normal] : affect appropriate [de-identified] : glasses.  [de-identified] : R ear with large amount of cerumen. left ear with minimal cerumen. TM pearly white. no external/internal erythema.  [de-identified] : beige keratotic lesion with irregular borders to left rib . mild point tenderness. there is also a beige keratotic lesion to LUQ abdomen aspect.

## 2024-06-13 NOTE — REVIEW OF SYSTEMS
[Lower Ext Edema] : lower extremity edema [Joint Stiffness] : joint stiffness [Negative] : Allergic/Immunologic [Dysphagia] : no dysphagia [Loss of Hearing] : no loss of hearing [Hoarseness] : no hoarseness [Odynophagia] : no odynophagia [Mucosal Pain] : no mucosal pain [FreeTextEntry2] : energy stable. not worse. weight stable.  [FreeTextEntry7] : no bowel changes

## 2024-06-13 NOTE — HISTORY OF PRESENT ILLNESS
[de-identified] : This is an 89-year-old woman presenting with worsening anemia. Patient was started on oral iron once a day by her primary care doctor.  She reports black stools since starting iron in feb 2022 when found anemia.  No blood or black stool before that. No change in bowel habits Now has skinnier stools now .  No  No pain or reflux in esophagus , no dysphagia  No nausea or vomiting  Loosing weight - 6months , 20pounds . states that she  eating the same  Feeling more SOB then usual also , about 6 months ago , sent pulmonary . Lungs ok.  Even walking on flat surfaces. 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 , had fall and left leg - knee and hip also .   Hx of right hip total replacement  Total shoulder was recommended   Just switched to dr jones , in feb 2022 , patient was never told anemic   Had colonoscopy and egd  over 5 yrs ago  Hx of esophageal problem with concern for monteiro , was doing feqent egd, before 2020.   3/2022- ct scan shows thickening in colon and stomach   [de-identified] : Patient presents for routine follow up with son. Recent Melanoma removal on left FA 4/9/2024. Started Keytruda 3 weeks ago planned to continue for 1 year. Patient overall not feeling well and more foggy. More tired and poor appetite. Patient also admits to not drinking enough water. Has a history of donating kidney. Patient's biggest complaint is her R foot; it is swollen and pink and warm to touch. It is more swollen than the L foot. As per son patient does not have history of edema. Patient describes it as feeling sore and tight. Patient states her foot is less swollen in AM.   Patient followed up with cardiologist recently who took her off her metoprolol.

## 2024-06-13 NOTE — HISTORY OF PRESENT ILLNESS
[de-identified] : This is an 89-year-old woman presenting with worsening anemia.\par  Patient was started on oral iron once a day by her primary care doctor.\par  \par  She reports black stools since starting iron in feb 2022 when found anemia.  No blood or black stool before that. No change in bowel habits\par  Now has skinnier stools now .  No \par  No pain or reflux in esophagus , no dysphagia  No nausea or vomiting \par  Loosing weight - 6months , 20pounds . states that she  eating the same \par  Feeling more SOB then usual also , about 6 months ago , sent pulmonary . Lungs ok. \par  Even walking on flat surfaces. 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 , had fall and left leg - knee and hip also . \par  \par  Hx of right hip total replacement \par  Total shoulder was recommended \par  \par  Just switched to dr jones , in feb 2022 , patient was never told anemic \par  \par  Had colonoscopy and egd  over 5 yrs ago \par  Hx of esophageal problem with concern for monteiro , was doing feqent egd, before 2020. \par  \par  3/2022- ct scan shows thickening in colon and stomach \par   [de-identified] : Patient presents for routine follow up Recent Melanoma removal on left FA 4/9/2024 Reports feeling well besides fatigue

## 2024-06-13 NOTE — REVIEW OF SYSTEMS
[Joint Stiffness] : joint stiffness [Negative] : Allergic/Immunologic [Dysphagia] : no dysphagia [Loss of Hearing] : no loss of hearing [Hoarseness] : no hoarseness [Odynophagia] : no odynophagia [Mucosal Pain] : no mucosal pain [FreeTextEntry2] : energy stable. not worse. weight stable.  [FreeTextEntry7] : no bowel changes  Detail Level: Zone General Sunscreen Counseling: I recommended a broad spectrum sunscreen with a SPF of 30 or higher.  I explained that SPF 30 sunscreens block approximately 97 percent of the sun's harmful rays.  Sunscreens should be applied at least 15 minutes prior to expected sun exposure and then every 2 hours after that as long as sun exposure continues. If swimming or exercising sunscreen should be reapplied every 45 minutes to an hour after getting wet or sweating.  One ounce, or the equivalent of a shot glass full of sunscreen, is adequate to protect the skin not covered by a bathing suit. I also recommended a lip balm with a sunscreen as well. Sun protective clothing can be used in lieu of sunscreen but must be worn the entire time you are exposed to the sun's rays.

## 2024-07-02 ENCOUNTER — LABORATORY RESULT (OUTPATIENT)
Age: 89
End: 2024-07-02

## 2024-07-02 ENCOUNTER — APPOINTMENT (OUTPATIENT)
Dept: HEMATOLOGY ONCOLOGY | Facility: CLINIC | Age: 89
End: 2024-07-02
Payer: MEDICARE

## 2024-07-02 ENCOUNTER — RESULT REVIEW (OUTPATIENT)
Age: 89
End: 2024-07-02

## 2024-07-02 VITALS
RESPIRATION RATE: 16 BRPM | SYSTOLIC BLOOD PRESSURE: 123 MMHG | BODY MASS INDEX: 20.91 KG/M2 | TEMPERATURE: 98.1 F | WEIGHT: 118 LBS | HEART RATE: 84 BPM | OXYGEN SATURATION: 96 % | DIASTOLIC BLOOD PRESSURE: 69 MMHG | HEIGHT: 63 IN

## 2024-07-02 DIAGNOSIS — E04.1 NONTOXIC SINGLE THYROID NODULE: ICD-10-CM

## 2024-07-02 DIAGNOSIS — C43.62 MALIGNANT MELANOMA OF LEFT UPPER LIMB, INCLUDING SHOULDER: ICD-10-CM

## 2024-07-02 PROCEDURE — 99214 OFFICE O/P EST MOD 30 MIN: CPT

## 2024-07-23 ENCOUNTER — RESULT REVIEW (OUTPATIENT)
Age: 89
End: 2024-07-23

## 2024-10-09 ENCOUNTER — RESULT REVIEW (OUTPATIENT)
Age: 89
End: 2024-10-09

## 2024-10-10 ENCOUNTER — APPOINTMENT (OUTPATIENT)
Dept: HEMATOLOGY ONCOLOGY | Facility: CLINIC | Age: 89
End: 2024-10-10

## 2024-10-17 ENCOUNTER — LABORATORY RESULT (OUTPATIENT)
Age: 89
End: 2024-10-17

## 2024-10-17 ENCOUNTER — APPOINTMENT (OUTPATIENT)
Dept: HEMATOLOGY ONCOLOGY | Facility: CLINIC | Age: 89
End: 2024-10-17

## 2024-10-17 ENCOUNTER — RESULT REVIEW (OUTPATIENT)
Age: 89
End: 2024-10-17

## 2024-10-17 VITALS
HEIGHT: 63 IN | SYSTOLIC BLOOD PRESSURE: 125 MMHG | WEIGHT: 121 LBS | DIASTOLIC BLOOD PRESSURE: 73 MMHG | TEMPERATURE: 97.7 F | OXYGEN SATURATION: 99 % | BODY MASS INDEX: 21.44 KG/M2 | HEART RATE: 78 BPM | RESPIRATION RATE: 16 BRPM

## 2024-10-17 DIAGNOSIS — E04.1 NONTOXIC SINGLE THYROID NODULE: ICD-10-CM

## 2024-10-17 DIAGNOSIS — R59.0 LOCALIZED ENLARGED LYMPH NODES: ICD-10-CM

## 2024-10-17 DIAGNOSIS — C43.62 MALIGNANT MELANOMA OF LEFT UPPER LIMB, INCLUDING SHOULDER: ICD-10-CM

## 2024-10-17 PROCEDURE — 99214 OFFICE O/P EST MOD 30 MIN: CPT

## 2024-10-23 ENCOUNTER — RESULT REVIEW (OUTPATIENT)
Age: 89
End: 2024-10-23

## 2024-11-12 ENCOUNTER — RESULT REVIEW (OUTPATIENT)
Age: 89
End: 2024-11-12

## 2024-11-12 ENCOUNTER — APPOINTMENT (OUTPATIENT)
Dept: HEMATOLOGY ONCOLOGY | Facility: CLINIC | Age: 89
End: 2024-11-12
Payer: MEDICARE

## 2024-11-12 VITALS
OXYGEN SATURATION: 96 % | TEMPERATURE: 97.7 F | HEIGHT: 63 IN | WEIGHT: 111.1 LBS | HEART RATE: 91 BPM | RESPIRATION RATE: 16 BRPM | DIASTOLIC BLOOD PRESSURE: 72 MMHG | BODY MASS INDEX: 19.68 KG/M2 | SYSTOLIC BLOOD PRESSURE: 109 MMHG

## 2024-11-12 DIAGNOSIS — C43.62 MALIGNANT MELANOMA OF LEFT UPPER LIMB, INCLUDING SHOULDER: ICD-10-CM

## 2024-11-12 PROCEDURE — 99215 OFFICE O/P EST HI 40 MIN: CPT

## 2024-11-14 ENCOUNTER — NON-APPOINTMENT (OUTPATIENT)
Age: 89
End: 2024-11-14

## 2024-12-04 ENCOUNTER — APPOINTMENT (OUTPATIENT)
Dept: SURGERY | Facility: CLINIC | Age: 88
End: 2024-12-04
Payer: MEDICARE

## 2024-12-04 VITALS — SYSTOLIC BLOOD PRESSURE: 110 MMHG | HEART RATE: 69 BPM | TEMPERATURE: 97.4 F | DIASTOLIC BLOOD PRESSURE: 71 MMHG

## 2024-12-04 DIAGNOSIS — E04.1 NONTOXIC SINGLE THYROID NODULE: ICD-10-CM

## 2024-12-04 PROCEDURE — 99204 OFFICE O/P NEW MOD 45 MIN: CPT

## 2024-12-27 ENCOUNTER — RESULT REVIEW (OUTPATIENT)
Age: 88
End: 2024-12-27

## 2024-12-31 ENCOUNTER — NON-APPOINTMENT (OUTPATIENT)
Age: 88
End: 2024-12-31

## 2025-01-08 ENCOUNTER — NON-APPOINTMENT (OUTPATIENT)
Age: 89
End: 2025-01-08

## 2025-01-09 ENCOUNTER — NON-APPOINTMENT (OUTPATIENT)
Age: 89
End: 2025-01-09

## 2025-02-11 ENCOUNTER — APPOINTMENT (OUTPATIENT)
Dept: HEMATOLOGY ONCOLOGY | Facility: CLINIC | Age: 89
End: 2025-02-11
Payer: MEDICARE

## 2025-02-11 ENCOUNTER — RESULT REVIEW (OUTPATIENT)
Age: 89
End: 2025-02-11

## 2025-02-11 VITALS
RESPIRATION RATE: 16 BRPM | BODY MASS INDEX: 20.32 KG/M2 | HEART RATE: 66 BPM | SYSTOLIC BLOOD PRESSURE: 121 MMHG | HEIGHT: 63 IN | DIASTOLIC BLOOD PRESSURE: 74 MMHG | TEMPERATURE: 97.5 F | WEIGHT: 114.7 LBS | OXYGEN SATURATION: 97 %

## 2025-02-11 PROCEDURE — 99214 OFFICE O/P EST MOD 30 MIN: CPT

## 2025-02-26 ENCOUNTER — RESULT REVIEW (OUTPATIENT)
Age: 89
End: 2025-02-26

## 2025-02-27 ENCOUNTER — NON-APPOINTMENT (OUTPATIENT)
Age: 89
End: 2025-02-27

## 2025-03-03 ENCOUNTER — NON-APPOINTMENT (OUTPATIENT)
Age: 89
End: 2025-03-03

## 2025-03-04 ENCOUNTER — APPOINTMENT (OUTPATIENT)
Dept: BREAST CENTER | Facility: CLINIC | Age: 89
End: 2025-03-04
Payer: MEDICARE

## 2025-03-04 VITALS
HEIGHT: 63 IN | SYSTOLIC BLOOD PRESSURE: 127 MMHG | DIASTOLIC BLOOD PRESSURE: 55 MMHG | OXYGEN SATURATION: 96 % | WEIGHT: 120 LBS | BODY MASS INDEX: 21.26 KG/M2 | HEART RATE: 71 BPM

## 2025-03-04 DIAGNOSIS — C43.62 MALIGNANT MELANOMA OF LEFT UPPER LIMB, INCLUDING SHOULDER: ICD-10-CM

## 2025-03-04 PROCEDURE — 99213 OFFICE O/P EST LOW 20 MIN: CPT

## 2025-03-26 ENCOUNTER — APPOINTMENT (OUTPATIENT)
Dept: HEMATOLOGY ONCOLOGY | Facility: CLINIC | Age: 89
End: 2025-03-26
Payer: MEDICARE

## 2025-03-26 DIAGNOSIS — E04.1 NONTOXIC SINGLE THYROID NODULE: ICD-10-CM

## 2025-03-26 DIAGNOSIS — C43.62 MALIGNANT MELANOMA OF LEFT UPPER LIMB, INCLUDING SHOULDER: ICD-10-CM

## 2025-03-26 PROCEDURE — 99214 OFFICE O/P EST MOD 30 MIN: CPT | Mod: 2W

## 2025-04-07 ENCOUNTER — TRANSCRIPTION ENCOUNTER (OUTPATIENT)
Age: 89
End: 2025-04-07

## 2025-08-05 DIAGNOSIS — M79.89 OTHER SPECIFIED SOFT TISSUE DISORDERS: ICD-10-CM

## 2025-08-08 ENCOUNTER — RESULT REVIEW (OUTPATIENT)
Age: 89
End: 2025-08-08